# Patient Record
Sex: FEMALE | Race: WHITE | NOT HISPANIC OR LATINO | Employment: FULL TIME | URBAN - METROPOLITAN AREA
[De-identification: names, ages, dates, MRNs, and addresses within clinical notes are randomized per-mention and may not be internally consistent; named-entity substitution may affect disease eponyms.]

---

## 2018-07-13 DIAGNOSIS — H90.5 CENTRAL HEARING LOSS: Primary | ICD-10-CM

## 2018-07-17 ENCOUNTER — HOSPITAL ENCOUNTER (OUTPATIENT)
Dept: RADIOLOGY | Facility: HOSPITAL | Age: 58
Discharge: HOME | End: 2018-07-17
Attending: OTOLARYNGOLOGY
Payer: COMMERCIAL

## 2018-07-17 DIAGNOSIS — H90.5 CENTRAL HEARING LOSS: ICD-10-CM

## 2018-07-17 PROCEDURE — 70553 MRI BRAIN STEM W/O & W/DYE: CPT | Mod: TC

## 2018-07-17 PROCEDURE — 70553 MRI BRAIN STEM W/O & W/DYE: CPT | Mod: 26,,, | Performed by: RADIOLOGY

## 2018-07-17 PROCEDURE — 25500020 PHARM REV CODE 255: Performed by: OTOLARYNGOLOGY

## 2018-07-17 PROCEDURE — A9585 GADOBUTROL INJECTION: HCPCS | Performed by: OTOLARYNGOLOGY

## 2018-07-17 RX ORDER — GADOBUTROL 604.72 MG/ML
5 INJECTION INTRAVENOUS
Status: COMPLETED | OUTPATIENT
Start: 2018-07-17 | End: 2018-07-17

## 2018-07-17 RX ORDER — GADOBUTROL 604.72 MG/ML
INJECTION INTRAVENOUS
Status: DISPENSED
Start: 2018-07-17 | End: 2018-07-17

## 2018-07-17 RX ADMIN — GADOBUTROL 5 ML: 604.72 INJECTION INTRAVENOUS at 09:07

## 2019-11-21 DIAGNOSIS — G47.00 INSOMNIA, UNSPECIFIED TYPE: Primary | ICD-10-CM

## 2019-11-21 RX ORDER — TEMAZEPAM 30 MG/1
30 CAPSULE ORAL NIGHTLY PRN
Qty: 90 CAPSULE | Refills: 0 | Status: SHIPPED | OUTPATIENT
Start: 2019-11-21 | End: 2020-02-19

## 2019-11-21 NOTE — TELEPHONE ENCOUNTER
----- Message from Yonis Nava sent at 11/21/2019  3:53 PM CST -----  Pt is needing refills on Restoril for a 90 day supply   Pharm [facility]   Pt [phone]

## 2020-03-25 ENCOUNTER — TELEPHONE (OUTPATIENT)
Dept: FAMILY MEDICINE | Facility: CLINIC | Age: 60
End: 2020-03-25

## 2020-03-25 ENCOUNTER — OFFICE VISIT (OUTPATIENT)
Dept: FAMILY MEDICINE | Facility: CLINIC | Age: 60
End: 2020-03-25
Payer: COMMERCIAL

## 2020-03-25 DIAGNOSIS — M48.02 SPINAL STENOSIS IN CERVICAL REGION: ICD-10-CM

## 2020-03-25 DIAGNOSIS — M51.26 HERNIATED LUMBAR INTERVERTEBRAL DISC: ICD-10-CM

## 2020-03-25 DIAGNOSIS — J30.1 SEASONAL ALLERGIC RHINITIS DUE TO POLLEN: Primary | ICD-10-CM

## 2020-03-25 DIAGNOSIS — M48.061 NEURAL FORAMINAL STENOSIS OF LUMBAR SPINE: ICD-10-CM

## 2020-03-25 DIAGNOSIS — G95.9 CERVICAL MYELOPATHY: ICD-10-CM

## 2020-03-25 DIAGNOSIS — F51.01 PRIMARY INSOMNIA: ICD-10-CM

## 2020-03-25 DIAGNOSIS — M43.12 SPONDYLOLISTHESIS OF CERVICAL REGION: ICD-10-CM

## 2020-03-25 PROCEDURE — 99213 PR OFFICE/OUTPT VISIT, EST, LEVL III, 20-29 MIN: ICD-10-PCS | Mod: 95,,, | Performed by: FAMILY MEDICINE

## 2020-03-25 PROCEDURE — 99213 OFFICE O/P EST LOW 20 MIN: CPT | Mod: 95,,, | Performed by: FAMILY MEDICINE

## 2020-03-25 RX ORDER — AMOXICILLIN 500 MG/1
500 CAPSULE ORAL EVERY 8 HOURS
Qty: 30 CAPSULE | Refills: 0 | Status: SHIPPED | OUTPATIENT
Start: 2020-03-25 | End: 2022-03-28

## 2020-03-25 RX ORDER — TEMAZEPAM 30 MG/1
30 CAPSULE ORAL NIGHTLY
Qty: 90 CAPSULE | Refills: 0 | Status: SHIPPED | OUTPATIENT
Start: 2020-03-25 | End: 2020-12-17 | Stop reason: SDUPTHER

## 2020-03-25 RX ORDER — FLUTICASONE PROPIONATE 50 MCG
1 SPRAY, SUSPENSION (ML) NASAL DAILY
Qty: 18.2 ML | Refills: 3 | Status: SHIPPED | OUTPATIENT
Start: 2020-03-25 | End: 2022-03-28

## 2020-03-25 NOTE — PROGRESS NOTES
SUBJECTIVE:    Patient ID: Puma Oreilly is a 59 y.o. female.    Chief Complaint: No chief complaint on file.    59-year-old female is having a telemedicine visit.  She complains of a scratchy throat and runny eyes.  She does have seasonal pollen allergies and has been taking Zyrtec.  She has a hoarse voice no fever or chills and no shortness of breath.  She has minor discharge through the nares.  And a minor cough.    She sees Dr. leonid Arango for orthopedic problems he did a cervical fusion on her approximately 1 year ago.  And she notes a good improvement in her neck with only minor numbness and tingling at this time.  She sees Dr. Bernardo Cardiology, had a normal stress test in 2018.  Dr. Gonzalez did a colonoscopy prior to his gastric bypass surgery on her in .  She has not had a colonoscopy since and is due for 1.    She does not do any daily regimen did exercise but enjoys gardening and going for walks on the [place] approximately 20 min with her friends.      No visits with results within 6 Month(s) from this visit.   Latest known visit with results is:   Lab Visit on 2018   Component Date Value Ref Range Status    Creatinine 2018 0.7  0.5 - 1.4 mg/dL Final    eGFR if African American 2018 >60  >60 mL/min/1.73 m^2 Final    eGFR if non African American 2018 >60  >60 mL/min/1.73 m^2 Final       Past Medical History:   Diagnosis Date    Breast, fat necrosis     bilateral    GERD (gastroesophageal reflux disease)     PONV (postoperative nausea and vomiting)     Wears glasses      Past Surgical History:   Procedure Laterality Date    ABDOMINAL SURGERY      abdominalplasty    breast augmentation      breast reduction      CARPAL TUNNEL RELEASE      right    carpel tunnel       SECTION, CLASSIC      esphogeal stricture      GASTRIC BYPASS  2009    HYSTERECTOMY      PARTIAL NEPHRECTOMY      Rt Kidney    SHOULDER SURGERY      SPINAL FUSION   3/2015    [facility]  Dr Pichardo    THIGH LIFT      TONSILLECTOMY, ADENOIDECTOMY       Family History   Problem Relation Age of Onset    Diabetes Mother     Prostate cancer Father     Stroke Maternal Aunt     Cancer Maternal Grandmother         stomach    Lung cancer Maternal Grandmother     Diabetes Maternal Grandfather     Cancer Paternal Grandmother         lung       Marital Status:   Alcohol History:  reports that she does not drink alcohol.  Tobacco History:  reports that she has never smoked. She has never used smokeless tobacco.  Drug History:  reports that she does not use drugs.    Review of patient's allergies indicates:   Allergen Reactions    Hydrocodone Nausea Only       Current Outpatient Medications:     amoxicillin (AMOXIL) 500 MG capsule, Take 1 capsule (500 mg total) by mouth every 8 (eight) hours., Disp: 30 capsule, Rfl: 0    cyanocobalamin (VITAMIN B-12) 1000 MCG tablet, Take 1 tablet (1,000 mcg total) by mouth once daily., Disp: 90 tablet, Rfl: 3    fluticasone (VERAMYST) 27.5 mcg/actuation nasal spray, 2 sprays by Nasal route once daily., Disp: 15.8 mL, Rfl: 3    fluticasone propionate (FLONASE) 50 mcg/actuation nasal spray, 1 spray (50 mcg total) by Each Nostril route once daily., Disp: 18.2 mL, Rfl: 3    gabapentin (NEURONTIN) 100 MG capsule, Take 1 capsule (100 mg total) by mouth 2 (two) times daily., Disp: 60 capsule, Rfl: 2    multivitamin with minerals tablet, Take 1 tablet by mouth once daily., Disp: , Rfl:     temazepam (RESTORIL) 30 mg capsule, Take 1 capsule (30 mg total) by mouth every evening., Disp: 90 capsule, Rfl: 0    Review of Systems   Constitutional: Negative for appetite change, chills, fatigue, fever and unexpected weight change.   HENT: Positive for sneezing. Negative for ear pain, sinus pain, sore throat and trouble swallowing.    Eyes: Negative for pain, discharge and visual disturbance.   Respiratory: Positive for cough. Negative for apnea, chest  tightness, shortness of breath and wheezing.    Cardiovascular: Negative for chest pain, palpitations and leg swelling.   Gastrointestinal: Negative for abdominal pain, blood in stool, constipation, diarrhea, nausea and vomiting.   Endocrine: Negative for heat intolerance, polydipsia and polyuria.   Genitourinary: Negative for difficulty urinating, dyspareunia, dysuria, frequency, hematuria and menstrual problem.   Musculoskeletal: Positive for neck pain (Neck pain is improved since cervical fusion.). Negative for arthralgias, back pain (occas upper back pains occas , on no pain meds), gait problem, joint swelling and myalgias.   Allergic/Immunologic: Negative for environmental allergies, food allergies and immunocompromised state.   Neurological: Negative for dizziness, tremors, seizures, numbness (pins and  needles in neck ) and headaches.   Psychiatric/Behavioral: Positive for sleep disturbance (Uses temazepam for sleep nightly.). Negative for behavioral problems, confusion, hallucinations and suicidal ideas. The patient is not nervous/anxious.           Objective:      There were no vitals filed for this visit.  There is no height or weight on file to calculate BMI.  Physical Exam      Assessment:       1. Seasonal allergic rhinitis due to pollen    2. Spinal stenosis in cervical region    3. Herniated lumbar intervertebral disc    4. Cervical myelopathy    5. Spondylolisthesis of cervical region    6. Neural foraminal stenosis of lumbar spine    7. Primary insomnia         Plan:       Seasonal allergic rhinitis due to pollen  -     fluticasone (VERAMYST) 27.5 mcg/actuation nasal spray; 2 sprays by Nasal route once daily.  Dispense: 15.8 mL; Refill: 3  -     amoxicillin (AMOXIL) 500 MG capsule; Take 1 capsule (500 mg total) by mouth every 8 (eight) hours.  Dispense: 30 capsule; Refill: 0  Patient has symptoms of allergic rhinitis leading into sinusitis.  Will treat with fluticasone and amoxicillin  Spinal  stenosis in cervical region  Improved greatly since cervical surgery with Dr. Arango  Herniated lumbar intervertebral disc    Cervical myelopathy  Minimal numbness and tingling  Spondylolisthesis of cervical region    Neural foraminal stenosis of lumbar spine    Primary insomnia  -     temazepam (RESTORIL) 30 mg capsule; Take 1 capsule (30 mg total) by mouth every evening.  Dispense: 90 capsule; Refill: 0  Continue temazepam 30 mg HS    Follow up in about 3 months (around 6/25/2020).    The patient location is:  home  Visit type: Virtual visit with synchronous audio and video    Total time spent with patient: 20     Each patient to whom he or she provides medical services by telemedicine is:  (1) informed of the relationship between the physician and patient and the respective role of any other health care provider with respect to management of the patient; and (2) notified that he or she may decline to receive medical services by telemedicine and may withdraw from such care at any time.     This note was created using surespot voice recognition software that occasionally misinterprets phrases or words.

## 2020-03-25 NOTE — TELEPHONE ENCOUNTER
----- Message from Yonis Nava sent at 3/25/2020  8:49 AM CDT -----  Pt she needing something for her allergies sent in . Water eyes, itchy throat , no fever   Temazepam 90 day supply   [facility]   Pt [phone]

## 2020-03-25 NOTE — TELEPHONE ENCOUNTER
Spoke with pt and her . They are going to set themselves up for the portal and call back for the VV.

## 2020-05-14 ENCOUNTER — TELEPHONE (OUTPATIENT)
Dept: FAMILY MEDICINE | Facility: CLINIC | Age: 60
End: 2020-05-14

## 2020-05-14 DIAGNOSIS — Z12.31 OTHER SCREENING MAMMOGRAM: Primary | ICD-10-CM

## 2020-05-14 NOTE — TELEPHONE ENCOUNTER
----- Message from Isela Matute sent at 5/14/2020  3:28 PM CDT -----  Pt requesting a mammogram order be sent to [facility] imaging for her annual   cb # [phone]

## 2020-06-09 ENCOUNTER — HOSPITAL ENCOUNTER (OUTPATIENT)
Dept: RADIOLOGY | Facility: HOSPITAL | Age: 60
Discharge: HOME | End: 2020-06-09
Attending: FAMILY MEDICINE
Payer: COMMERCIAL

## 2020-06-09 DIAGNOSIS — Z12.31 OTHER SCREENING MAMMOGRAM: ICD-10-CM

## 2020-06-09 PROCEDURE — 77067 SCR MAMMO BI INCL CAD: CPT | Mod: TC,PO

## 2020-06-16 ENCOUNTER — TELEPHONE (OUTPATIENT)
Dept: FAMILY MEDICINE | Facility: CLINIC | Age: 60
End: 2020-06-16

## 2020-06-16 NOTE — PROGRESS NOTES
Spoke to patient with results verbatim per Dr Byrne. Verbalized understanding. Would like copy sent to Dr Yaquelin Michel, sent to her.

## 2020-06-16 NOTE — TELEPHONE ENCOUNTER
----- Message from Venu Byrne MD sent at 6/14/2020  6:50 PM CDT -----  Call patient.  Her mammogram showed the presence of the breast  Implants, however, no breast tumors or cancers were seen.  Repeat mammogram in 1 year

## 2020-12-17 DIAGNOSIS — F51.01 PRIMARY INSOMNIA: ICD-10-CM

## 2020-12-17 RX ORDER — TEMAZEPAM 30 MG/1
30 CAPSULE ORAL NIGHTLY
Qty: 90 CAPSULE | Refills: 0 | Status: SHIPPED | OUTPATIENT
Start: 2020-12-17 | End: 2021-01-20

## 2021-01-20 ENCOUNTER — TELEPHONE (OUTPATIENT)
Dept: FAMILY MEDICINE | Facility: CLINIC | Age: 61
End: 2021-01-20

## 2021-01-20 DIAGNOSIS — F51.01 PRIMARY INSOMNIA: Primary | ICD-10-CM

## 2021-01-20 RX ORDER — TRAZODONE HYDROCHLORIDE 50 MG/1
50 TABLET ORAL NIGHTLY
Qty: 30 TABLET | Refills: 2 | Status: SHIPPED | OUTPATIENT
Start: 2021-01-20 | End: 2021-01-27

## 2021-01-25 ENCOUNTER — TELEPHONE (OUTPATIENT)
Dept: FAMILY MEDICINE | Facility: CLINIC | Age: 61
End: 2021-01-25

## 2021-01-27 ENCOUNTER — TELEPHONE (OUTPATIENT)
Dept: FAMILY MEDICINE | Facility: CLINIC | Age: 61
End: 2021-01-27

## 2021-01-27 DIAGNOSIS — F51.01 PRIMARY INSOMNIA: ICD-10-CM

## 2021-01-27 DIAGNOSIS — N30.01 ACUTE CYSTITIS WITH HEMATURIA: Primary | ICD-10-CM

## 2021-01-27 RX ORDER — TRAZODONE HYDROCHLORIDE 100 MG/1
100 TABLET ORAL NIGHTLY
Qty: 30 TABLET | Refills: 3 | Status: SHIPPED | OUTPATIENT
Start: 2021-01-27 | End: 2023-10-03

## 2021-01-27 RX ORDER — NITROFURANTOIN 25; 75 MG/1; MG/1
100 CAPSULE ORAL 2 TIMES DAILY
Qty: 20 CAPSULE | Refills: 0 | Status: SHIPPED | OUTPATIENT
Start: 2021-01-27 | End: 2022-03-28

## 2021-04-12 ENCOUNTER — TELEPHONE (OUTPATIENT)
Dept: FAMILY MEDICINE | Facility: CLINIC | Age: 61
End: 2021-04-12

## 2021-04-12 DIAGNOSIS — Z12.31 OTHER SCREENING MAMMOGRAM: Primary | ICD-10-CM

## 2021-04-26 ENCOUNTER — TELEPHONE (OUTPATIENT)
Dept: FAMILY MEDICINE | Facility: CLINIC | Age: 61
End: 2021-04-26

## 2021-05-24 ENCOUNTER — TELEPHONE (OUTPATIENT)
Dept: FAMILY MEDICINE | Facility: CLINIC | Age: 61
End: 2021-05-24

## 2021-06-11 ENCOUNTER — HOSPITAL ENCOUNTER (OUTPATIENT)
Dept: RADIOLOGY | Facility: HOSPITAL | Age: 61
Discharge: HOME | End: 2021-06-11
Attending: FAMILY MEDICINE
Payer: COMMERCIAL

## 2021-06-11 VITALS — HEIGHT: 60 IN | BODY MASS INDEX: 24.76 KG/M2 | WEIGHT: 126.13 LBS

## 2021-06-11 DIAGNOSIS — Z12.31 OTHER SCREENING MAMMOGRAM: ICD-10-CM

## 2021-06-11 PROCEDURE — 77067 SCR MAMMO BI INCL CAD: CPT | Mod: TC,PO

## 2021-06-14 ENCOUNTER — TELEPHONE (OUTPATIENT)
Dept: FAMILY MEDICINE | Facility: CLINIC | Age: 61
End: 2021-06-14

## 2021-12-14 ENCOUNTER — TELEPHONE (OUTPATIENT)
Dept: CARDIOLOGY | Facility: CLINIC | Age: 61
End: 2021-12-14
Payer: COMMERCIAL

## 2021-12-14 ENCOUNTER — NURSE TRIAGE (OUTPATIENT)
Dept: ADMINISTRATIVE | Facility: CLINIC | Age: 61
End: 2021-12-14
Payer: COMMERCIAL

## 2022-03-28 ENCOUNTER — OFFICE VISIT (OUTPATIENT)
Dept: CARDIOLOGY | Facility: CLINIC | Age: 62
End: 2022-03-28
Payer: COMMERCIAL

## 2022-03-28 VITALS
DIASTOLIC BLOOD PRESSURE: 78 MMHG | SYSTOLIC BLOOD PRESSURE: 116 MMHG | WEIGHT: 135 LBS | OXYGEN SATURATION: 98 % | BODY MASS INDEX: 26.37 KG/M2 | HEART RATE: 66 BPM

## 2022-03-28 DIAGNOSIS — I25.110 ATHEROSCLEROSIS OF NATIVE CORONARY ARTERY OF NATIVE HEART WITH UNSTABLE ANGINA PECTORIS: Primary | ICD-10-CM

## 2022-03-28 DIAGNOSIS — G95.9 CERVICAL MYELOPATHY: ICD-10-CM

## 2022-03-28 DIAGNOSIS — Z98.84 WEIGHT GAIN FOLLOWING GASTRIC BYPASS SURGERY: ICD-10-CM

## 2022-03-28 DIAGNOSIS — R63.5 WEIGHT GAIN FOLLOWING GASTRIC BYPASS SURGERY: ICD-10-CM

## 2022-03-28 DIAGNOSIS — R07.89 OTHER CHEST PAIN: ICD-10-CM

## 2022-03-28 PROCEDURE — 99214 OFFICE O/P EST MOD 30 MIN: CPT | Mod: S$GLB,,, | Performed by: INTERNAL MEDICINE

## 2022-03-28 PROCEDURE — 99214 PR OFFICE/OUTPT VISIT, EST, LEVL IV, 30-39 MIN: ICD-10-PCS | Mod: S$GLB,,, | Performed by: INTERNAL MEDICINE

## 2022-03-28 PROCEDURE — 1160F PR REVIEW ALL MEDS BY PRESCRIBER/CLIN PHARMACIST DOCUMENTED: ICD-10-PCS | Mod: CPTII,S$GLB,, | Performed by: INTERNAL MEDICINE

## 2022-03-28 PROCEDURE — 3074F PR MOST RECENT SYSTOLIC BLOOD PRESSURE < 130 MM HG: ICD-10-PCS | Mod: CPTII,S$GLB,, | Performed by: INTERNAL MEDICINE

## 2022-03-28 PROCEDURE — 93005 EKG 12-LEAD: ICD-10-PCS | Mod: S$GLB,,, | Performed by: INTERNAL MEDICINE

## 2022-03-28 PROCEDURE — 3074F SYST BP LT 130 MM HG: CPT | Mod: CPTII,S$GLB,, | Performed by: INTERNAL MEDICINE

## 2022-03-28 PROCEDURE — 3078F DIAST BP <80 MM HG: CPT | Mod: CPTII,S$GLB,, | Performed by: INTERNAL MEDICINE

## 2022-03-28 PROCEDURE — 93005 ELECTROCARDIOGRAM TRACING: CPT | Mod: S$GLB,,, | Performed by: INTERNAL MEDICINE

## 2022-03-28 PROCEDURE — 3078F PR MOST RECENT DIASTOLIC BLOOD PRESSURE < 80 MM HG: ICD-10-PCS | Mod: CPTII,S$GLB,, | Performed by: INTERNAL MEDICINE

## 2022-03-28 PROCEDURE — 3008F BODY MASS INDEX DOCD: CPT | Mod: CPTII,S$GLB,, | Performed by: INTERNAL MEDICINE

## 2022-03-28 PROCEDURE — 1159F MED LIST DOCD IN RCRD: CPT | Mod: CPTII,S$GLB,, | Performed by: INTERNAL MEDICINE

## 2022-03-28 PROCEDURE — 3008F PR BODY MASS INDEX (BMI) DOCUMENTED: ICD-10-PCS | Mod: CPTII,S$GLB,, | Performed by: INTERNAL MEDICINE

## 2022-03-28 PROCEDURE — 1160F RVW MEDS BY RX/DR IN RCRD: CPT | Mod: CPTII,S$GLB,, | Performed by: INTERNAL MEDICINE

## 2022-03-28 PROCEDURE — 1159F PR MEDICATION LIST DOCUMENTED IN MEDICAL RECORD: ICD-10-PCS | Mod: CPTII,S$GLB,, | Performed by: INTERNAL MEDICINE

## 2022-03-28 RX ORDER — PANTOPRAZOLE SODIUM 40 MG/1
40 TABLET, DELAYED RELEASE ORAL DAILY
Qty: 30 TABLET | Refills: 11 | Status: SHIPPED | OUTPATIENT
Start: 2022-03-28 | End: 2022-06-06 | Stop reason: SDUPTHER

## 2022-03-28 NOTE — PROGRESS NOTES
Subjective:    Patient ID:  Puma Oreilly is a 61 y.o. female patient here for evaluation Establish Care and Chest Pain (She was having chest pains in December, she has some reoccurring pains after she eats)      History of Present Illness:   Patient is a 61-year-old lady with history of gastric bypass surgery has been doing reasonably well.  Lately she has been cut back on her lunch hour and she has been eating and [place] and has some difficulty after eating for her meals fast.  She developed some intermittent episodes of chest discomfort arm this gradually gets up.  She is seeking evaluation regarding the same in view of risk factors and family history.  Pain is always localized in this substernal area always in the same spot no jaw pain shoulder pain and occasional nausea is present.  But no significant dyspeptic symptoms are noted.  There is no occurrence of such pain with physical activity climbing stairs or other doing other activities.  She denies having any are irregularities in her bowels.  There is no blood in the stools no black stools          Review of patient's allergies indicates:   Allergen Reactions    Hydrocodone Nausea Only       Past Medical History:   Diagnosis Date    Breast, fat necrosis     bilateral    GERD (gastroesophageal reflux disease)     PONV (postoperative nausea and vomiting)     Wears glasses      Past Surgical History:   Procedure Laterality Date    ABDOMINAL SURGERY      abdominalplasty    AUGMENTATION OF BREAST      breast augmentation      breast reduction      CARPAL TUNNEL RELEASE      right    carpel tunnel       SECTION, CLASSIC      esphogeal stricture      GASTRIC BYPASS  2009    HYSTERECTOMY      PARTIAL NEPHRECTOMY      Rt Kidney    SHOULDER SURGERY      SMALL INTESTINE SURGERY      SPINAL FUSION  3/2015    [facility]  Dr Pichardo    THIGH LIFT      TONSILLECTOMY, ADENOIDECTOMY      TOTAL REDUCTION MAMMOPLASTY       Social History      Tobacco Use    Smoking status: Never Smoker    Smokeless tobacco: Never Used   Substance Use Topics    Alcohol use: No    Drug use: No        Review of Systems   As noted in HPI in addition     Constitutional: Negative for chills, fatigue and fever.   Eyes: No double vision, No blurred vision  Neuro: No headaches, she did have some tinnitus about 3 years ago that was treated with steroids with marginal improvement.  This has gotten better lately  Respiratory: Negative for cough, shortness of breath and wheezing.    Cardiovascular:  Positive for recurrent  chest pain. Negative for palpitations and leg swelling.   Gastrointestinal: Negative for abdominal pain, No melena, diarrhea, nausea and vomiting.   Genitourinary: Negative for dysuria and frequency, Negative for hematuria  Skin: Negative for bruising, Negative for edema or discoloration noted.   Endocrine: Negative for polyphagia, Negative for heat intolerance, Negative for cold intolerance  Psychiatric: Negative for depression, Negative for anxiety, Negative for memory loss  Musculoskeletal: Negative for neck pain, Negative for muscle weakness, Negative for back pain          Objective        Vitals:    03/28/22 1659   BP: 116/78   Pulse: 66       LIPIDS - LAST 2   Lab Results   Component Value Date    CHOL 149 05/26/2011    HDL 70 05/26/2011    LDLCALC 68.6 05/26/2011    TRIG 52 05/26/2011    CHOLHDL 47.0 05/26/2011       CBC - LAST 2  Lab Results   Component Value Date    WBC 6.80 06/06/2016    WBC 5.80 02/26/2016    RBC 4.80 06/06/2016    RBC 4.80 02/26/2016    HGB 12.8 06/06/2016    HGB 12.7 02/26/2016    HCT 39.3 06/06/2016    HCT 39.3 02/26/2016    MCV 82 06/06/2016    MCV 82 02/26/2016    MCH 26.6 (L) 06/06/2016    MCH 26.5 (L) 02/26/2016    MCHC 32.5 06/06/2016    MCHC 32.4 02/26/2016    RDW 13.0 06/06/2016    RDW 13.8 02/26/2016     06/06/2016     02/26/2016    MPV 8.0 (L) 06/06/2016    MPV 8.2 (L) 02/26/2016    GRAN 4.0 06/06/2016     GRAN 58.6 06/06/2016    LYMPH 2.4 06/06/2016    LYMPH 35.6 06/06/2016    MONO 0.3 06/06/2016    MONO 3.9 (L) 06/06/2016    BASO 0.00 06/06/2016    BASO 0.00 02/26/2016       CHEMISTRY & LIVER FUNCTION - LAST 2  Lab Results   Component Value Date     06/06/2016     02/26/2016    K 4.1 06/06/2016    K 4.2 02/26/2016     06/06/2016     02/26/2016    CO2 24 06/06/2016    CO2 26 02/26/2016    ANIONGAP 9 06/06/2016    ANIONGAP 10 02/26/2016    BUN 13 06/06/2016    BUN 12 02/26/2016    CREATININE 0.7 07/17/2018    CREATININE 0.8 06/06/2016     (H) 06/06/2016    GLU 81 02/26/2016    CALCIUM 9.4 06/06/2016    CALCIUM 9.2 02/26/2016    MG 2.1 06/06/2016    MG 2.3 06/17/2013    ALBUMIN 4.2 06/06/2016    ALBUMIN 4.4 02/26/2016    PROT 7.0 06/06/2016    PROT 7.2 02/26/2016    ALKPHOS 104 06/06/2016    ALKPHOS 110 02/26/2016    ALT 19 06/06/2016    ALT 22 02/26/2016    AST 23 06/06/2016    AST 26 02/26/2016    BILITOT 0.5 06/06/2016    BILITOT 0.2 02/26/2016        CARDIAC PROFILE - LAST 2  Lab Results   Component Value Date     06/06/2016     12/28/2015        COAGULATION - LAST 2  Lab Results   Component Value Date    INR 1.0 02/24/2015    APTT 28.0 02/24/2015       ENDOCRINE & PSA - LAST 2  Lab Results   Component Value Date    TSH 0.750 05/26/2011        ECHOCARDIOGRAM RESULTS  No results found for this or any previous visit.      CURRENT/PREVIOUS VISIT EKG  Results for orders placed or performed during the hospital encounter of 02/24/15   EKG 12-lead    Collection Time: 02/24/15 10:22 AM    Narrative    Test Reason : 723.1    Vent. Rate : 058 BPM     Atrial Rate : 058 BPM     P-R Int : 156 ms          QRS Dur : 080 ms      QT Int : 394 ms       P-R-T Axes : 018 025 042 degrees     QTc Int : 386 ms    Sinus bradycardia  Otherwise normal ECG  When compared with ECG of 17-JUN-2013 07:43,  No significant change was found  Confirmed by Juan Marie MD ([id number]) on 2/24/2015 9:25:31  PM    Referred By: KENNEY RM           Confirmed By:Juan Marie MD     No valid procedures specified.   No results found for this or any previous visit.    No valid procedures specified.          PREVIOUS STRESS TEST              PREVIOUS ANGIOGRAM        PHYSICAL EXAM    GENERAL: well built, well nourished, well-developed in no apparent distress alert and oriented.   HEENT: Normocephalic. Pupils normal and conjunctivae normal.  Mucous membranes normal, no cyanosis or icterus, trachea central,no pallor or icterus is noted..   NECK: No JVD. No bruit..  Surgical scar in the neck did noted.  THYROID: Thyroid not enlarged. No nodules present..   CARDIAC: Regular rate and rhythm. S1 is normal.S2 is normal.No gallops, clicks or murmurs noted at this time.  CHEST ANATOMY: normal.   LUNGS: Clear to auscultation. No wheezing or rhonchi..   ABDOMEN: Soft no masses or organomegaly.  No abdomen pulsations or bruits.  Normal bowel sounds. No pulsations and no masses felt, No guarding or rebound.   EXTREMITIES: No cyanosis, clubbing or edema noted at this time., no calf tenderness bilaterally.   PERIPHERAL VASCULAR SYSTEM: Good palpable distal pulses.   CENTRAL NERVOUS SYSTEM:  Detailed exam was not performed  SKIN: Skin without lesions, moist, well perfused.   MUSCLE STRENGTH & TONE: No noteable weakness, atrophy or abnormal movement.     I HAVE REVIEWED :    The vital signs, nurses notes, and all the pertinent radiology and labs.        Current Outpatient Medications   Medication Instructions    multivitamin with minerals tablet 1 tablet, Oral, Daily    traZODone (DESYREL) 100 mg, Oral, Nightly          Assessment & Plan     Other chest pain  Patient has episodes of chest pain with some atypical features.  Arm will obtain a symptom limited exercise stress test and with myocardial perfusion imaging study.  In the meantime I will start on Protonix at 40 mg daily her EKG today shows sinus rhythm and sinus arrhythmia with no  acute changes noted.    Cervical myelopathy  Check history of cervical myelopathy and she had 2 cervical surgeries completed.  With fusion    Weight gain following gastric bypass surgery  She has done remarkably well.  She has gained about 20 lb over the course of years of since the bypass surgery and she is doing remarkably well at this time I have encouraged to obtain all her are annual blood work as well as a vitamin B12 and are iron levels as a part of her workup this times.    Dyslipidemia and  Strong family history of premature heart disease in atrial fibrillation    A recommended her to obtain complete panel of blood work to include are chemistry as well as liver lipid profile.        No follow-ups on file.

## 2022-03-28 NOTE — ASSESSMENT & PLAN NOTE
She has done remarkably well.  She has gained about 20 lb over the course of years of since the bypass surgery and she is doing remarkably well at this time I have encouraged to obtain all her are annual blood work as well as a vitamin B12 and are iron levels as a part of her workup this times.

## 2022-03-28 NOTE — ASSESSMENT & PLAN NOTE
Patient has episodes of chest pain with some atypical features.  Arm will obtain a symptom limited exercise stress test and with myocardial perfusion imaging study.  In the meantime I will start on Protonix at 40 mg daily her EKG today shows sinus rhythm and sinus arrhythmia with no acute changes noted.

## 2022-03-29 ENCOUNTER — TELEPHONE (OUTPATIENT)
Dept: FAMILY MEDICINE | Facility: CLINIC | Age: 62
End: 2022-03-29
Payer: COMMERCIAL

## 2022-03-29 NOTE — TELEPHONE ENCOUNTER
----- Message from Isela Giovani sent at 3/29/2022 12:41 PM CDT -----  Pt calling said her cardiologist  Dr. SILVINO Bernardo old her to contact us to have labs ordered but he did not specify what test he wants done.   # [phone]

## 2022-03-30 ENCOUNTER — TELEPHONE (OUTPATIENT)
Dept: CARDIOLOGY | Facility: CLINIC | Age: 62
End: 2022-03-30
Payer: COMMERCIAL

## 2022-03-30 NOTE — TELEPHONE ENCOUNTER
----- Message from Jordon Escoto sent at 3/30/2022  2:56 PM CDT -----  Contact: pt  Needing a call back from a nurse to get bloodwork orders please call back pt asap     [phone] call back after 4 pm

## 2022-04-21 ENCOUNTER — TELEPHONE (OUTPATIENT)
Dept: CARDIOLOGY | Facility: CLINIC | Age: 62
End: 2022-04-21

## 2022-04-21 NOTE — TELEPHONE ENCOUNTER
----- Message from Manjula Vang sent at 4/21/2022 10:47 AM CDT -----  Type: Needs Medical Advice  Who Called:  Pt  Best Call Back Number: [phone] (home) [phone] (work)  Additional Information: Pt requesting Stress test to be scheduled after 05/23 and requesting return call to discuss if Lab orders are still available for Pt to - Pt requesting LVM on [phone]--please advise--thank you         Multiple attempts to reach patient today. No answer  Unable to leave voice mail. Will continue to try to reach patient.

## 2022-04-25 DIAGNOSIS — G95.9 CERVICAL MYELOPATHY: ICD-10-CM

## 2022-04-25 DIAGNOSIS — R07.89 OTHER CHEST PAIN: ICD-10-CM

## 2022-04-25 DIAGNOSIS — G95.9 MYELOPATHY: Primary | ICD-10-CM

## 2022-05-11 ENCOUNTER — TELEPHONE (OUTPATIENT)
Dept: CARDIOLOGY | Facility: CLINIC | Age: 62
End: 2022-05-11
Payer: COMMERCIAL

## 2022-05-11 NOTE — TELEPHONE ENCOUNTER
Spoke with pt, states she had labs at [facility] and was wanting to know if the results were sent to [facility]. Not seeing them as of yet. Pt wants a phone call after upcoming testing with results. Pt confirmed and verbalized understanding.

## 2022-05-11 NOTE — TELEPHONE ENCOUNTER
----- Message from Xander Ramirez sent at 5/11/2022  4:30 PM CDT -----  Type:  Patient Returning Call    Who Called:  Pt   Who Left Message for Patient:  Maggie  Does the patient know what this is regarding?:  results and appt  Best Call Back Number:  [phone]     Additional Information:  Please advise -- Thank you

## 2022-05-11 NOTE — TELEPHONE ENCOUNTER
----- Message from Jessica Martinez sent at 5/11/2022 11:07 AM CDT -----  Type: Needs Medical Advice  Who Called: Patient  Symptoms (please be specific):   How long has patient had these symptoms:    Pharmacy name and phone #:    Best Call Back Number: [phone] after 4:00  Additional Information: Pt requesting a call back with Lab results and to schedule a f/u appt.

## 2022-05-13 ENCOUNTER — TELEPHONE (OUTPATIENT)
Dept: CARDIOLOGY | Facility: CLINIC | Age: 62
End: 2022-05-13
Payer: COMMERCIAL

## 2022-05-13 NOTE — TELEPHONE ENCOUNTER
----- Message from Jordon Escoto sent at 5/13/2022 10:37 AM CDT -----  Contact: pt  Type: Needs Medical Advice    Who Called:pt  Best Call Back Number:[phone] leave a message if need be     Additional Information: [facility] and seeing if VB reviewed them please have a nurse call back pt, also needs appt after stress test      Please Advise-Thank you

## 2022-05-16 NOTE — TELEPHONE ENCOUNTER
Clinic has not received lab results as of yet. Pt states that she would like a phone call with testing results.

## 2022-05-17 ENCOUNTER — TELEPHONE (OUTPATIENT)
Dept: CARDIOLOGY | Facility: CLINIC | Age: 62
End: 2022-05-17
Payer: COMMERCIAL

## 2022-05-17 NOTE — TELEPHONE ENCOUNTER
----- Message from Zuly Ramirez sent at 5/17/2022  1:35 PM CDT -----  Contact: pt  Type: Needs Medical Advice    Who: Called: Pt     Best Call Back Number: [phone]    Inquiry/Question: ot is calling in for her lab results that  ordered she had done at [facility] she states she has called several times please call to advise  Thank you~

## 2022-05-17 NOTE — TELEPHONE ENCOUNTER
Spoke with pt, let her know labs have not been received and to please drop off for review. Pt verbalized understanding.

## 2022-05-25 ENCOUNTER — TELEPHONE (OUTPATIENT)
Dept: CARDIOLOGY | Facility: CLINIC | Age: 62
End: 2022-05-25
Payer: COMMERCIAL

## 2022-05-25 NOTE — TELEPHONE ENCOUNTER
Patient states she had her labs done at [facility] with [facility]. She is aware I will call to get the results sent over    Called to let her know [facility] had the wrong fax number

## 2022-05-25 NOTE — TELEPHONE ENCOUNTER
----- Message from Catarina Cox sent at 5/25/2022 10:25 AM CDT -----  Regarding: test results  Contact: Patient  Type:  Test Results    Who Called:  Patient   Name of Test (Lab/Mammo/Etc):  bloodwork  Date of Test:  3 weeks ago per pt  Ordering Provider:  Dr Bernardo  Where the test was performed:  [facility]  Best Call Back Number:  [phone] (home) [phone] (work)    Case number [id number]

## 2022-05-27 DIAGNOSIS — R07.89 OTHER CHEST PAIN: Primary | ICD-10-CM

## 2022-05-31 DIAGNOSIS — R07.9 CHEST PAIN, UNSPECIFIED TYPE: ICD-10-CM

## 2022-06-01 ENCOUNTER — CLINICAL SUPPORT (OUTPATIENT)
Dept: CARDIOLOGY | Facility: HOSPITAL | Age: 62
End: 2022-06-01
Attending: INTERNAL MEDICINE
Payer: COMMERCIAL

## 2022-06-01 ENCOUNTER — HOSPITAL ENCOUNTER (OUTPATIENT)
Dept: RADIOLOGY | Facility: HOSPITAL | Age: 62
Discharge: HOME | End: 2022-06-01
Attending: INTERNAL MEDICINE
Payer: COMMERCIAL

## 2022-06-01 VITALS — HEIGHT: 60 IN | BODY MASS INDEX: 26.49 KG/M2 | WEIGHT: 134.94 LBS

## 2022-06-01 DIAGNOSIS — Z98.84 WEIGHT GAIN FOLLOWING GASTRIC BYPASS SURGERY: ICD-10-CM

## 2022-06-01 DIAGNOSIS — I25.110 ATHEROSCLEROSIS OF NATIVE CORONARY ARTERY OF NATIVE HEART WITH UNSTABLE ANGINA PECTORIS: ICD-10-CM

## 2022-06-01 DIAGNOSIS — R07.9 CHEST PAIN, UNSPECIFIED TYPE: ICD-10-CM

## 2022-06-01 DIAGNOSIS — R07.89 OTHER CHEST PAIN: ICD-10-CM

## 2022-06-01 DIAGNOSIS — R63.5 WEIGHT GAIN FOLLOWING GASTRIC BYPASS SURGERY: ICD-10-CM

## 2022-06-01 LAB
AV INDEX (PROSTH): 0.98
AV MEAN GRADIENT: 3 MMHG
AV VALVE AREA: 3.15 CM2
BSA FOR ECHO PROCEDURE: 1.61 M2
CV STRESS BASE HR: 58 BPM
DIASTOLIC BLOOD PRESSURE: 79 MMHG
DOP CALC AO VTI: 24.28 CM
DOP CALC LVOT AREA: 3.2 CM2
DOP CALC LVOT DIAMETER: 2.02 CM
DOP CALC LVOT PEAK VEL: 100.83 M/S
DOP CALC LVOT STROKE VOLUME: 76.39 CM3
DOP CALCLVOT PEAK VEL VTI: 23.85 CM
E WAVE DECELERATION TIME: 140.89 MSEC
E/A RATIO: 1.48
E/E' RATIO: 8.9 M/S
EJECTION FRACTION: 60 %
FRACTIONAL SHORTENING: 43 % (ref 28–44)
IVRT: 78.73 MSEC
LEFT ATRIUM SIZE: 3.85 CM
LEFT INTERNAL DIMENSION IN SYSTOLE: 2.1 CM (ref 2.1–4)
LEFT VENTRICLE DIASTOLIC VOLUME INDEX: 33.89 ML/M2
LEFT VENTRICLE DIASTOLIC VOLUME: 53.55 ML
LEFT VENTRICLE SYSTOLIC VOLUME INDEX: 6.6 ML/M2
LEFT VENTRICLE SYSTOLIC VOLUME: 10.39 ML
LEFT VENTRICULAR INTERNAL DIMENSION IN DIASTOLE: 3.69 CM (ref 3.5–6)
LV LATERAL E/E' RATIO: 8.9 M/S
LV SEPTAL E/E' RATIO: 8.9 M/S
MV PEAK A VEL: 0.6 M/S
MV PEAK E VEL: 0.89 M/S
OHS CV CPX 1 MINUTE RECOVERY HEART RATE: 124 BPM
OHS CV CPX 85 PERCENT MAX PREDICTED HEART RATE MALE: 127
OHS CV CPX ESTIMATED METS: 10
OHS CV CPX MAX PREDICTED HEART RATE: 150
OHS CV CPX PATIENT IS FEMALE: 1
OHS CV CPX PATIENT IS MALE: 0
OHS CV CPX PEAK DIASTOLIC BLOOD PRESSURE: 100 MMHG
OHS CV CPX PEAK HEAR RATE: 171 BPM
OHS CV CPX PEAK RATE PRESSURE PRODUCT: NORMAL
OHS CV CPX PEAK SYSTOLIC BLOOD PRESSURE: 169 MMHG
OHS CV CPX PERCENT MAX PREDICTED HEART RATE ACHIEVED: 114
OHS CV CPX RATE PRESSURE PRODUCT PRESENTING: 8352
PISA TR MAX VEL: 2.3 M/S
RA PRESSURE: 3 MMHG
RIGHT VENTRICULAR END-DIASTOLIC DIMENSION: 283 CM
STRESS ECHO POST EXERCISE DUR MIN: 7 MINUTES
STRESS ECHO POST EXERCISE DUR SEC: 33 SECONDS
SYSTOLIC BLOOD PRESSURE: 144 MMHG
TDI LATERAL: 0.1 M/S
TDI SEPTAL: 0.1 M/S
TDI: 0.1 M/S
TR MAX PG: 21 MMHG
TRICUSPID ANNULAR PLANE SYSTOLIC EXCURSION: 242 CM
TV REST PULMONARY ARTERY PRESSURE: 24 MMHG

## 2022-06-01 PROCEDURE — 93016 NUCLEAR STRESS TEST (CUPID ONLY): ICD-10-PCS | Mod: ,,, | Performed by: GENERAL PRACTICE

## 2022-06-01 PROCEDURE — 93018 CV STRESS TEST I&R ONLY: CPT | Mod: ,,, | Performed by: GENERAL PRACTICE

## 2022-06-01 PROCEDURE — 93306 TTE W/DOPPLER COMPLETE: CPT | Mod: 26,,, | Performed by: GENERAL PRACTICE

## 2022-06-01 PROCEDURE — 93306 TTE W/DOPPLER COMPLETE: CPT

## 2022-06-01 PROCEDURE — 93017 CV STRESS TEST TRACING ONLY: CPT

## 2022-06-01 PROCEDURE — 93306 ECHO (CUPID ONLY): ICD-10-PCS | Mod: 26,,, | Performed by: GENERAL PRACTICE

## 2022-06-01 PROCEDURE — 93018 NUCLEAR STRESS TEST (CUPID ONLY): ICD-10-PCS | Mod: ,,, | Performed by: GENERAL PRACTICE

## 2022-06-01 PROCEDURE — 93016 CV STRESS TEST SUPVJ ONLY: CPT | Mod: ,,, | Performed by: GENERAL PRACTICE

## 2022-06-01 PROCEDURE — A9502 TC99M TETROFOSMIN: HCPCS

## 2022-06-02 ENCOUNTER — TELEPHONE (OUTPATIENT)
Dept: CARDIOLOGY | Facility: CLINIC | Age: 62
End: 2022-06-02
Payer: COMMERCIAL

## 2022-06-02 NOTE — TELEPHONE ENCOUNTER
----- Message from Abdi Rodriguez MA sent at 6/2/2022  1:05 PM CDT -----    Type: Test Results    Who Called: DESIREE COPELAND [[id number]]  Name of Test:  (labs, xray etc..) stress,echo  Date of Test: 06,01,22  Ordering Provider:bet,v    Best Call Back Number: [phone]   Thank you~

## 2022-06-06 RX ORDER — PANTOPRAZOLE SODIUM 40 MG/1
40 TABLET, DELAYED RELEASE ORAL DAILY
Qty: 90 TABLET | Refills: 3 | Status: SHIPPED | OUTPATIENT
Start: 2022-06-06 | End: 2023-10-03

## 2022-06-06 NOTE — TELEPHONE ENCOUNTER
----- Message from Rajinder Mendieta sent at 6/6/2022 10:14 AM CDT -----  Contact: pt  Type:  RX Refill Request    Who Called:  pt  Refill or New Rx:  refill / new  RX Name and Strength:  pantoprazole (PROTONIX) 40 MG tablet  How is the patient currently taking it? (ex. 1XDay):  1 x   Is this a 30 day or 90 day RX:  90  Preferred Pharmacy with phone number:      [facility]pharm[facility] #[id number] - [place]  [address]  [address] [address]  Phone: [phone] Fax: [phone]    Local or Mail Order:    Ordering Provider:    Best Call Back Number:  [phone]    Additional Information:  pt states her symptoms are not improving. Pt is requesting a stronger Rx or a different Rx. Please call to advise.

## 2022-06-10 ENCOUNTER — TELEPHONE (OUTPATIENT)
Dept: FAMILY MEDICINE | Facility: CLINIC | Age: 62
End: 2022-06-10

## 2022-06-10 NOTE — TELEPHONE ENCOUNTER
----- Message from Maria Guadalupe Cabrera sent at 6/10/2022  9:44 AM CDT -----  Pt dropped off lab results for provider.  [phone]

## 2022-06-15 ENCOUNTER — TELEPHONE (OUTPATIENT)
Dept: CARDIOLOGY | Facility: CLINIC | Age: 62
End: 2022-06-15
Payer: COMMERCIAL

## 2022-06-15 NOTE — TELEPHONE ENCOUNTER
----- Message from Jessica Martinez sent at 6/15/2022  9:50 AM CDT -----  Type: Needs Medical Advice  Who Called: Patient  Symptoms (please be specific):   How long has patient had these symptoms:    Pharmacy name and phone #:  [id number] - [place] [address] [place]   Phone:  [phone]  Fax:  [phone]  Best Call Back Number: [phone]  Additional Information: Pt requesting a call back concerning Rx   pantoprazole (PROTONIX) 40 MG tablet 90 tablet , pt state she needs something stronger OR a different medication, The pantoprazole (PROTONIX) does not help her.

## 2022-06-17 ENCOUNTER — TELEPHONE (OUTPATIENT)
Dept: CARDIOLOGY | Facility: CLINIC | Age: 62
End: 2022-06-17
Payer: COMMERCIAL

## 2022-06-17 NOTE — TELEPHONE ENCOUNTER
----- Message from Zuly Ramirez sent at 6/17/2022 12:57 PM CDT -----  Contact: pt  Type: Return Call    Who Called: Nurse   Who Left Message: Hermelinda   Does the patient know what this is regarding: Yes  Best Call Back Number: [phone]  Thank you~

## 2022-06-17 NOTE — TELEPHONE ENCOUNTER
----- Message from Abdi Rodriguez MA sent at 6/17/2022  8:50 AM CDT -----  Contact: DESIREE COPELAND [[id number]]  Type: Needs Medical Advice    Who Called: DESIREE COPELAND [[id number]]  Best Call Back Number: [phone]  Inquiry/Question: Wild you call DESIREE COPELAND [[id number]] regarding questions with test    Thank you~

## 2022-06-20 NOTE — TELEPHONE ENCOUNTER
Spoke with pt, had billing questions. Gave phone information for pt to contact. Pt verbalized understanding.

## 2022-06-30 ENCOUNTER — TELEPHONE (OUTPATIENT)
Dept: CARDIOLOGY | Facility: CLINIC | Age: 62
End: 2022-06-30
Payer: COMMERCIAL

## 2022-06-30 NOTE — TELEPHONE ENCOUNTER
----- Message from Abdi Rodriguez MA sent at 6/30/2022  9:32 AM CDT -----  Contact: DESIREE COPELAND [[id number]]  Type: Needs Medical Advice    Who Called:DESIREE COPELAND [[id number]]  Best Call Back Number: [phone]  Inquiry/Question: please call DESIREE COPELAND [[id number]] regarding medical record, test results need for 07/01/22.     Thank you~

## 2022-07-01 ENCOUNTER — TELEPHONE (OUTPATIENT)
Dept: CARDIOLOGY | Facility: CLINIC | Age: 62
End: 2022-07-01
Payer: COMMERCIAL

## 2022-07-05 ENCOUNTER — TELEPHONE (OUTPATIENT)
Dept: CARDIOLOGY | Facility: CLINIC | Age: 62
End: 2022-07-05
Payer: COMMERCIAL

## 2022-07-05 NOTE — TELEPHONE ENCOUNTER
----- Message from Abdi Rodriguez MA sent at 7/5/2022 11:48 AM CDT -----  Contact: DESIREE COPELAND [[id number]]  Type: Needs Medical Advice    Who CalledDESIREE COPELAND [[id number]]  Best Call Back Number: [phone]  Inquiry/Question: please call DESRIEE COPELAND [[id number]] regarding test results      Thank you~

## 2022-07-08 ENCOUNTER — TELEPHONE (OUTPATIENT)
Dept: CARDIOLOGY | Facility: CLINIC | Age: 62
End: 2022-07-08
Payer: COMMERCIAL

## 2022-07-12 DIAGNOSIS — Z12.31 ENCOUNTER FOR SCREENING MAMMOGRAM FOR MALIGNANT NEOPLASM OF BREAST: Primary | ICD-10-CM

## 2022-07-25 ENCOUNTER — HOSPITAL ENCOUNTER (OUTPATIENT)
Dept: RADIOLOGY | Facility: HOSPITAL | Age: 62
Discharge: HOME | End: 2022-07-25
Attending: FAMILY MEDICINE
Payer: COMMERCIAL

## 2022-07-25 DIAGNOSIS — Z12.31 ENCOUNTER FOR SCREENING MAMMOGRAM FOR MALIGNANT NEOPLASM OF BREAST: ICD-10-CM

## 2022-07-25 PROCEDURE — 77063 BREAST TOMOSYNTHESIS BI: CPT | Mod: TC,PO

## 2022-08-01 ENCOUNTER — TELEPHONE (OUTPATIENT)
Dept: FAMILY MEDICINE | Facility: CLINIC | Age: 62
End: 2022-08-01

## 2022-08-01 NOTE — TELEPHONE ENCOUNTER
----- Message from Deborah Castillo sent at 8/1/2022  4:29 PM CDT -----  Voicemail- Patient said someone from Dr. Byrne's office called her. She recently had a Mammogram. She would like a call back at 8:00 a.m. tomorrow morning. Patient's callback number is [phone].

## 2022-08-01 NOTE — TELEPHONE ENCOUNTER
----- Message from Venu Byrne MD sent at 7/31/2022  4:59 PM CDT -----  Call patient.  Mammogram was normal.  Repeat mammogram in 1 year.

## 2022-08-02 NOTE — TELEPHONE ENCOUNTER
----- Message from Daisy De Jesus sent at 8/2/2022  8:18 AM CDT -----  Pt is returning the office call. Pt #[phone]

## 2023-07-17 ENCOUNTER — TELEPHONE (OUTPATIENT)
Dept: CARDIOLOGY | Facility: CLINIC | Age: 63
End: 2023-07-17
Payer: COMMERCIAL

## 2023-07-17 NOTE — TELEPHONE ENCOUNTER
----- Message from Ana Hughes sent at 7/17/2023  4:37 PM CDT -----  Contact: Self  Type:  Same Day Appointment Request    Caller is requesting a same day appointment.  Caller declined first available appointment listed below.      Name of Caller:  Patient  When is the first available appointment?  09/11  Symptoms:  [facility] f/u, has rhythm strip from [facility] Call Back Number:  [phone]  Additional Information:   Please call pt back to schedule. Thank You.

## 2023-07-18 DIAGNOSIS — Z12.31 ENCOUNTER FOR SCREENING MAMMOGRAM FOR MALIGNANT NEOPLASM OF BREAST: Primary | ICD-10-CM

## 2023-07-18 NOTE — TELEPHONE ENCOUNTER
----- Message from Ijeoma Celis, Patient Care Assistant sent at 7/18/2023  1:20 PM CDT -----  Regarding: advice  Contact: pt  Type: Needs Medical Advice    Who Called:  pt     Best Call Back Number: [phone] (home)     Additional Information: pt states she would like a callback from an  regarding her surgery on yesterday. Please call pt to advise. Thanks!

## 2023-07-18 NOTE — TELEPHONE ENCOUNTER
Called and scheduled aristeo with Galina, she is aware I was unable to schedule her with Dr. Bernardo

## 2023-07-21 ENCOUNTER — OFFICE VISIT (OUTPATIENT)
Dept: CARDIOLOGY | Facility: CLINIC | Age: 63
End: 2023-07-21
Payer: COMMERCIAL

## 2023-07-21 VITALS
SYSTOLIC BLOOD PRESSURE: 122 MMHG | OXYGEN SATURATION: 98 % | HEIGHT: 60 IN | DIASTOLIC BLOOD PRESSURE: 78 MMHG | WEIGHT: 142 LBS | HEART RATE: 80 BPM | BODY MASS INDEX: 27.88 KG/M2

## 2023-07-21 DIAGNOSIS — R07.81 PLEURITIC CHEST PAIN: ICD-10-CM

## 2023-07-21 DIAGNOSIS — Z98.890 HX OF HERNIA REPAIR: ICD-10-CM

## 2023-07-21 DIAGNOSIS — Z87.19 HX OF HERNIA REPAIR: ICD-10-CM

## 2023-07-21 DIAGNOSIS — R00.9 ABNORMAL HEART RATE: Primary | ICD-10-CM

## 2023-07-21 DIAGNOSIS — R07.89 OTHER CHEST PAIN: ICD-10-CM

## 2023-07-21 PROCEDURE — 3078F PR MOST RECENT DIASTOLIC BLOOD PRESSURE < 80 MM HG: ICD-10-PCS | Mod: CPTII,S$GLB,,

## 2023-07-21 PROCEDURE — 93000 ELECTROCARDIOGRAM COMPLETE: CPT | Mod: S$GLB,,, | Performed by: INTERNAL MEDICINE

## 2023-07-21 PROCEDURE — 3078F DIAST BP <80 MM HG: CPT | Mod: CPTII,S$GLB,,

## 2023-07-21 PROCEDURE — 99999 PR PBB SHADOW E&M-EST. PATIENT-LVL III: CPT | Mod: PBBFAC,,,

## 2023-07-21 PROCEDURE — 1160F PR REVIEW ALL MEDS BY PRESCRIBER/CLIN PHARMACIST DOCUMENTED: ICD-10-PCS | Mod: CPTII,S$GLB,,

## 2023-07-21 PROCEDURE — 3008F PR BODY MASS INDEX (BMI) DOCUMENTED: ICD-10-PCS | Mod: CPTII,S$GLB,,

## 2023-07-21 PROCEDURE — 3288F PR FALLS RISK ASSESSMENT DOCUMENTED: ICD-10-PCS | Mod: CPTII,S$GLB,,

## 2023-07-21 PROCEDURE — 99214 OFFICE O/P EST MOD 30 MIN: CPT | Mod: S$GLB,,,

## 2023-07-21 PROCEDURE — 3288F FALL RISK ASSESSMENT DOCD: CPT | Mod: CPTII,S$GLB,,

## 2023-07-21 PROCEDURE — 1101F PR PT FALLS ASSESS DOC 0-1 FALLS W/OUT INJ PAST YR: ICD-10-PCS | Mod: CPTII,S$GLB,,

## 2023-07-21 PROCEDURE — 3008F BODY MASS INDEX DOCD: CPT | Mod: CPTII,S$GLB,,

## 2023-07-21 PROCEDURE — 99999 PR PBB SHADOW E&M-EST. PATIENT-LVL III: ICD-10-PCS | Mod: PBBFAC,,,

## 2023-07-21 PROCEDURE — 3074F SYST BP LT 130 MM HG: CPT | Mod: CPTII,S$GLB,,

## 2023-07-21 PROCEDURE — 1159F PR MEDICATION LIST DOCUMENTED IN MEDICAL RECORD: ICD-10-PCS | Mod: CPTII,S$GLB,,

## 2023-07-21 PROCEDURE — 1160F RVW MEDS BY RX/DR IN RCRD: CPT | Mod: CPTII,S$GLB,,

## 2023-07-21 PROCEDURE — 1159F MED LIST DOCD IN RCRD: CPT | Mod: CPTII,S$GLB,,

## 2023-07-21 PROCEDURE — 1101F PT FALLS ASSESS-DOCD LE1/YR: CPT | Mod: CPTII,S$GLB,,

## 2023-07-21 PROCEDURE — 3074F PR MOST RECENT SYSTOLIC BLOOD PRESSURE < 130 MM HG: ICD-10-PCS | Mod: CPTII,S$GLB,,

## 2023-07-21 PROCEDURE — 99214 PR OFFICE/OUTPT VISIT, EST, LEVL IV, 30-39 MIN: ICD-10-PCS | Mod: S$GLB,,,

## 2023-07-21 PROCEDURE — 93000 EKG 12-LEAD: ICD-10-PCS | Mod: S$GLB,,, | Performed by: INTERNAL MEDICINE

## 2023-07-21 RX ORDER — METHYLPREDNISOLONE 4 MG/1
TABLET ORAL
Qty: 21 EACH | Refills: 0 | Status: SHIPPED | OUTPATIENT
Start: 2023-07-21 | End: 2023-08-11

## 2023-07-21 NOTE — ASSESSMENT & PLAN NOTE
Patient had tachycardia during recovery phase of procedure.  Possible MELCHOR prolonging with dropped beat- consider second-degree AV block Wenckebach.   Recommend event monitor for 1 week for further evaluation.

## 2023-07-21 NOTE — ASSESSMENT & PLAN NOTE
Pain with deep breathing s/p laparoscopic hernia repair.  Equal Breath sounds bilaterally.  Likely inflammatory in nature. Recommend Medrol dose pack.  Patient to follow-up with GI next week s/p procedure

## 2023-07-21 NOTE — PROGRESS NOTES
Subjective:    Patient ID:  Puma Oreilly is a 63 y.o. female patient here for evaluation Follow-up (Abnormal heart rate during a Laparoscopy Hernia repair surgery  . Chest fullness and back hurting. )      History of Present Illness:       Patient is here for a checkup.  She would a recent laparoscopic hernia repair surgery where she had an abnormal heart rate.  Rhythm strip post procedure reviewed.  Sinus rhythm which looks like possible second-degree AV block- Wenckebach.  This was during recovery. Patient has 4 incision to upper abdomen.  She has chest fullness and discomfort when she takes a deep breath.  No anginal equivalent symptoms. EKG today in office shows normal sinus rhythm  Rightward access, low voltage QRS.  She is passing gas and has had a bowel movement since procedure.    /78.      Review of patient's allergies indicates:   Allergen Reactions    Hydrocodone Nausea Only       Past Medical History:   Diagnosis Date    Breast, fat necrosis     bilateral    GERD (gastroesophageal reflux disease)     PONV (postoperative nausea and vomiting)     Wears glasses      Past Surgical History:   Procedure Laterality Date    ABDOMINAL SURGERY      abdominalplasty    AUGMENTATION OF BREAST      breast augmentation      breast reduction      CARPAL TUNNEL RELEASE      right    carpel tunnel       SECTION, CLASSIC      esphogeal stricture      GASTRIC BYPASS  2009    HYSTERECTOMY      PARTIAL NEPHRECTOMY      Rt Kidney    SHOULDER SURGERY      SMALL INTESTINE SURGERY      SPINAL FUSION  3/2015    ACDF  Dr Pichardo    THIGH LIFT      TONSILLECTOMY, ADENOIDECTOMY      TOTAL REDUCTION MAMMOPLASTY       Social History     Tobacco Use    Smoking status: Never    Smokeless tobacco: Never   Substance Use Topics    Alcohol use: No    Drug use: No        Review of Systems:    As noted in HPI in addition      REVIEW OF SYSTEMS  CARDIOVASCULAR: No recent chest pain, palpitations, arm, neck, or jaw pain   + CP with deep breathing  RESPIRATORY: No recent fever, cough chills, SOB or congestion  : No blood in the urine  GI: No Nausea, vomiting, constipation, diarrhea, blood, or reflux.  MUSCULOSKELETAL: No myalgias  NEURO: No lightheadedness or dizziness  EYES: No Double vision, blurry, vision or headache              Objective        Vitals:    07/21/23 1304   BP: 122/78   Pulse: 80       LIPIDS - LAST 2   Lab Results   Component Value Date    CHOL 149 05/26/2011    HDL 70 05/26/2011    LDLCALC 68.6 05/26/2011    TRIG 52 05/26/2011    CHOLHDL 47.0 05/26/2011       CBC - LAST 2  Lab Results   Component Value Date    WBC 6.80 06/06/2016    WBC 5.80 02/26/2016    RBC 4.80 06/06/2016    RBC 4.80 02/26/2016    HGB 12.8 06/06/2016    HGB 12.7 02/26/2016    HCT 39.3 06/06/2016    HCT 39.3 02/26/2016    MCV 82 06/06/2016    MCV 82 02/26/2016    MCH 26.6 (L) 06/06/2016    MCH 26.5 (L) 02/26/2016    MCHC 32.5 06/06/2016    MCHC 32.4 02/26/2016    RDW 13.0 06/06/2016    RDW 13.8 02/26/2016     06/06/2016     02/26/2016    MPV 8.0 (L) 06/06/2016    MPV 8.2 (L) 02/26/2016    GRAN 4.0 06/06/2016    GRAN 58.6 06/06/2016    LYMPH 2.4 06/06/2016    LYMPH 35.6 06/06/2016    MONO 0.3 06/06/2016    MONO 3.9 (L) 06/06/2016    BASO 0.00 06/06/2016    BASO 0.00 02/26/2016       CHEMISTRY & LIVER FUNCTION - LAST 2  Lab Results   Component Value Date     06/06/2016     02/26/2016    K 4.1 06/06/2016    K 4.2 02/26/2016     06/06/2016     02/26/2016    CO2 24 06/06/2016    CO2 26 02/26/2016    ANIONGAP 9 06/06/2016    ANIONGAP 10 02/26/2016    BUN 13 06/06/2016    BUN 12 02/26/2016    CREATININE 0.7 07/17/2018    CREATININE 0.8 06/06/2016     (H) 06/06/2016    GLU 81 02/26/2016    CALCIUM 9.4 06/06/2016    CALCIUM 9.2 02/26/2016    MG 2.1 06/06/2016    MG 2.3 06/17/2013    ALBUMIN 4.2 06/06/2016    ALBUMIN 4.4 02/26/2016    PROT 7.0 06/06/2016    PROT 7.2 02/26/2016    ALKPHOS 104 06/06/2016     ALKPHOS 110 02/26/2016    ALT 19 06/06/2016    ALT 22 02/26/2016    AST 23 06/06/2016    AST 26 02/26/2016    BILITOT 0.5 06/06/2016    BILITOT 0.2 02/26/2016        CARDIAC PROFILE - LAST 2  Lab Results   Component Value Date     06/06/2016     12/28/2015        COAGULATION - LAST 2  Lab Results   Component Value Date    INR 1.0 02/24/2015    APTT 28.0 02/24/2015       ENDOCRINE & PSA - LAST 2  Lab Results   Component Value Date    TSH 0.750 05/26/2011        ECHOCARDIOGRAM RESULTS  Results for orders placed in visit on 06/01/22    Echo    Interpretation Summary  · The estimated PA systolic pressure is 24 mmHg.  · The left ventricle is normal in size with normal systolic function.  · Normal left ventricular diastolic function.  · Normal right ventricular size with normal right ventricular systolic function.  · Normal central venous pressure (3 mmHg).  · The estimated ejection fraction is 60%.      CURRENT/PREVIOUS VISIT EKG  Results for orders placed or performed in visit on 03/28/22   IN OFFICE EKG 12-LEAD (to [facility])    Collection Time: 03/28/22  5:08 PM    Narrative    Test Reason : I25.110,R07.89,R63.5,Z98.84,    Vent. Rate : 062 BPM     Atrial Rate : 062 BPM     P-R Int : 150 ms          QRS Dur : 068 ms      QT Int : 384 ms       P-R-T Axes : 014 012 041 degrees     QTc Int : 389 ms    Normal sinus rhythm with sinus arrhythmia  Normal ECG  When compared with ECG of 24-FEB-2015 10:22,  No significant change was found  Confirmed by Romana CHURCHILL, Elvin NAQVI ([id number]) on 4/17/2022 5:01:29 PM    Referred By:  RICKY           Confirmed By:Elvin Avendano MD     No valid procedures specified.   Results for orders placed in visit on 06/01/22    Nuclear Stress Test    Interpretation Summary    The nuclear portion of this study will be reported separately.    The EKG portion of this study is negative for ischemia.    The patient reported no chest pain during the stress test.    There were no arrhythmias  during stress.    No valid procedures specified.    PHYSICAL EXAM  CONSTITUTIONAL: Well built, well nourished in no apparent distress  NECK: no carotid bruit, no JVD  LUNGS: CTA  CHEST WALL: no tenderness  HEART: regular rate and rhythm, S1, S2 normal, no murmur, click, rub or gallop   ABDOMEN: soft,mild tenderness in upper abdomen; bowel sounds normal- no acute distention.  4 small incision noted on abdomen- healing, no acute signs of infection  EXTREMITIES: Extremities normal, no edema,   NEURO: AAO X 3    I HAVE REVIEWED :    The vital signs, nurses notes, and all the pertinent radiology and labs.        Current Outpatient Medications   Medication Instructions    methylPREDNISolone (MEDROL DOSEPACK) 4 mg tablet use as directed    multivitamin with minerals tablet 1 tablet, Oral, Daily    pantoprazole (PROTONIX) 40 mg, Oral, Daily    traZODone (DESYREL) 100 mg, Oral, Nightly          Assessment & Plan     Other chest pain  Patient complains of chest pain that is worsened with deep breathing and certain movements.  S/p laparoscopic hernia repair.  Chest pain appears more pleuritic in nature.  Recommend Medrol Dosepak.  Abdomen is soft, mild tenderness in the upper quadrants.  No acute distention.  Active bowel sounds.  Recommend adding simethicone over-the-counter for gas pain.        Pleuritic chest pain  Pain with deep breathing s/p laparoscopic hernia repair.  Equal Breath sounds bilaterally.  Likely inflammatory in nature. Recommend Medrol dose pack.  Patient to follow-up with GI next week s/p procedure    Hx of hernia repair  Recent hernia repair.  Pleuritic pain s/p procedure. Medrol dose pack.  Discussed with patient that she can try NSAIDS ibuprofen for a short duration, 1 week if pain doesn't improve with steroids.    Abnormal heart rate  Patient had tachycardia during recovery phase of procedure.  Possible MELCHOR prolonging with dropped beat- consider second-degree AV block Kvngnckebach.   Recommend event  monitor for 1 week for further evaluation.          No follow-ups on file.

## 2023-07-21 NOTE — ASSESSMENT & PLAN NOTE
Recent hernia repair.  Pleuritic pain s/p procedure. Medrol dose pack.  Discussed with patient that she can try NSAIDS ibuprofen for a short duration, 1 week if pain doesn't improve with steroids.

## 2023-07-21 NOTE — ASSESSMENT & PLAN NOTE
Patient complains of chest pain that is worsened with deep breathing and certain movements.  S/p laparoscopic hernia repair.  Chest pain appears more pleuritic in nature.  Recommend Medrol Dosepak.  Abdomen is soft, mild tenderness in the upper quadrants.  No acute distention.  Active bowel sounds.  Recommend adding simethicone over-the-counter for gas pain.

## 2023-07-24 DIAGNOSIS — R00.9 ABNORMAL HEART RATE: ICD-10-CM

## 2023-07-24 DIAGNOSIS — I44.1 SECOND DEGREE AV BLOCK: Primary | ICD-10-CM

## 2023-08-03 ENCOUNTER — HOSPITAL ENCOUNTER (OUTPATIENT)
Dept: RADIOLOGY | Facility: HOSPITAL | Age: 63
Discharge: HOME | End: 2023-08-03
Attending: FAMILY MEDICINE
Payer: COMMERCIAL

## 2023-08-03 DIAGNOSIS — Z12.31 ENCOUNTER FOR SCREENING MAMMOGRAM FOR MALIGNANT NEOPLASM OF BREAST: ICD-10-CM

## 2023-08-03 PROCEDURE — 77067 SCR MAMMO BI INCL CAD: CPT | Mod: TC,PO

## 2023-08-04 ENCOUNTER — TELEPHONE (OUTPATIENT)
Dept: CARDIOLOGY | Facility: CLINIC | Age: 63
End: 2023-08-04
Payer: COMMERCIAL

## 2023-08-04 NOTE — TELEPHONE ENCOUNTER
----- Message from Kaylin Worthy sent at 8/4/2023 12:17 PM CDT -----  Type: Need Medical Advice   Who Called: Patient  Best callback number: [phone]  Additional Information: Patient called to get test results   Please call to further assist with test results, Thanks.

## 2023-08-07 ENCOUNTER — TELEPHONE (OUTPATIENT)
Dept: FAMILY MEDICINE | Facility: CLINIC | Age: 63
End: 2023-08-07

## 2023-08-07 NOTE — TELEPHONE ENCOUNTER
----- Message from Venu Byrne MD sent at 8/6/2023  6:20 PM CDT -----  Call patient.  Mammogram was normal.  Repeat mammogram in 1 year.

## 2023-08-08 NOTE — TELEPHONE ENCOUNTER
Called patient again, not home. She works for the school system per . This makes 3 attempts. Can I chen result complete?

## 2023-08-09 ENCOUNTER — TELEPHONE (OUTPATIENT)
Dept: FAMILY MEDICINE | Facility: CLINIC | Age: 63
End: 2023-08-09

## 2023-08-09 NOTE — TELEPHONE ENCOUNTER
----- Message from Mariangel Ordonez sent at 8/9/2023  8:03 AM CDT -----  Returning a call from yesterday. Pt's #  [phone]

## 2023-08-14 ENCOUNTER — TELEPHONE (OUTPATIENT)
Dept: CARDIOLOGY | Facility: CLINIC | Age: 63
End: 2023-08-14
Payer: COMMERCIAL

## 2023-08-14 DIAGNOSIS — I47.10 SVT (SUPRAVENTRICULAR TACHYCARDIA): Primary | ICD-10-CM

## 2023-08-14 RX ORDER — METOPROLOL TARTRATE 25 MG/1
12.5 TABLET, FILM COATED ORAL 2 TIMES DAILY
Qty: 30 TABLET | Refills: 11 | Status: ON HOLD | OUTPATIENT
Start: 2023-08-14 | End: 2024-02-09 | Stop reason: HOSPADM

## 2023-08-14 RX ORDER — LANOLIN ALCOHOL/MO/W.PET/CERES
400 CREAM (GRAM) TOPICAL DAILY
Qty: 90 TABLET | Refills: 3 | Status: SHIPPED | OUTPATIENT
Start: 2023-08-14

## 2023-08-14 NOTE — TELEPHONE ENCOUNTER
Tried to call patient but patient was not home. Did leave detailed vm for patient regarding meds and appt with Dr. Romero. Did advise pt to call us tomorrow if questions.

## 2023-08-15 ENCOUNTER — TELEPHONE (OUTPATIENT)
Dept: CARDIOLOGY | Facility: CLINIC | Age: 63
End: 2023-08-15

## 2023-08-15 NOTE — TELEPHONE ENCOUNTER
----- Message from Flaquito Lyons sent at 8/15/2023 10:59 AM CDT -----  Regarding: questions  Contact: alirio at [phone]  Type: Needs Medical Advice    Who Called:  Alirio     Best Call Back Number: [phone] please leave message on home phone b/c pt cannot take calls in class.    Additional Information: pt had abd hernia surgery in July 2023. Aft surgery heart rate was erratic. Pt surgery area is still infected where laparoscopic surgical instrument was inserted. PT needs to know if needs to get med filled or let body adjust from surgery.

## 2023-08-15 NOTE — TELEPHONE ENCOUNTER
Patient was called to verify she did get vm yesterday. Patient did and she said she wanted to let Dr. Bernardo know she had a surgery under anesthesia and this all started post surgery.

## 2023-08-25 ENCOUNTER — TELEPHONE (OUTPATIENT)
Dept: CARDIOLOGY | Facility: CLINIC | Age: 63
End: 2023-08-25
Payer: COMMERCIAL

## 2023-08-25 NOTE — TELEPHONE ENCOUNTER
----- Message from Kaylin Worthy sent at 8/25/2023 10:29 AM CDT -----  Type: Need Medical Advice   Who Called: Patient   Best callback number: [phone]  Additional Information: Patient called wanting to know if she can be seen before her scheduled appointment on 9/6 she is still having symptoms in her chest  Please call to further assist, Thanks.

## 2023-08-25 NOTE — TELEPHONE ENCOUNTER
Tried to call patient but had to leave vm that if she is having sx she needs to go to ER due to the only appt I have is the one for 9/6 that she is scheduled for.

## 2023-08-31 NOTE — PROGRESS NOTES
"Subjective:     HPI    I had the pleasure of seeing Puma Oreilly in consultation at your request for the evaluation of SVT. She is a 63F who was noted to have an abnormal heart rhythm following laparoscopic hernia repair in 7/2023 (strips not available for review). Pt had no cardiac sx during this time. This prompted a 7 day Zio monitor (reviewed by me today) showing sinus rhythm average 76 bpm (range  bpm), PAC burden 4%, and 2058 runs of SVT, longest 94 minutes with average rate 154 bpm. Given variable AV block, SVT is most consistent with AT  ms. Pt had no symptoms while wearing monitor.    Pt admits to occasional palpitations lasting at most 5 minutes. She has a home HR monitor which "jumps around".    Pt currently on lopressor 12.5 mg bid, started following monitor result.    Echo in 6/2022 showed EF 60%. Exercise SPECT in 6/2022 negative for ischemia.     Notably, pt has a history of PAF (12/12/2018--seen on ECG preop for spine surgery). No records available for review.    My interpretation of today's ECG is    Review of Systems   Constitutional: Negative for decreased appetite, malaise/fatigue, weight gain and weight loss.   HENT:  Negative for sore throat.    Eyes:  Negative for blurred vision.   Cardiovascular:  Positive for palpitations. Negative for chest pain, dyspnea on exertion, irregular heartbeat, leg swelling, near-syncope, orthopnea, paroxysmal nocturnal dyspnea and syncope.   Respiratory:  Negative for shortness of breath.    Skin:  Negative for rash.   Musculoskeletal:  Negative for arthritis.   Gastrointestinal:  Negative for abdominal pain.   Neurological:  Negative for focal weakness.   Psychiatric/Behavioral:  Negative for altered mental status.      Objective:   Physical Exam  Vitals and nursing note reviewed.   Constitutional:       General: She is not in acute distress.     Appearance: She is well-developed.   HENT:      Head: Normocephalic and atraumatic.   Eyes:      " General: No scleral icterus.     Conjunctiva/sclera: Conjunctivae normal.      Pupils: Pupils are equal, round, and reactive to light.   Neck:      Thyroid: No thyromegaly.      Vascular: No JVD.   Cardiovascular:      Rate and Rhythm: Normal rate and regular rhythm.      Pulses: Intact distal pulses.      Heart sounds: Normal heart sounds. No murmur heard.     No friction rub. No gallop.   Pulmonary:      Effort: Pulmonary effort is normal. No respiratory distress.      Breath sounds: Normal breath sounds.   Abdominal:      General: Bowel sounds are normal. There is no distension.      Palpations: Abdomen is soft.   Musculoskeletal:      Cervical back: Normal range of motion and neck supple.   Skin:     General: Skin is warm and dry.   Neurological:      Mental Status: She is alert and oriented to person, place, and time.   Psychiatric:         Behavior: Behavior normal.         Assessment:      1. SVT (supraventricular tachycardia)        Plan:     In summary, Puma Oreilly  is a 63F with a history of symptomatic, sustained AT. Discussed pathophysiology of AT as well as options for management including antiarrhythmics and catheter based therapies. Discussed risks, benefits, indications, alternatives to invasive EPS and AT ablation. After considering her options she has agreed to proceed with meds.     Plan is to start rythmol sr 225 mg bid, 3 day monitor in 2 months, follow-up 3 months.    Thank you for allowing me to participate in the care of this patient. Please do not hesitate to call me with any questions or concerns.

## 2023-09-01 PROBLEM — I47.10 SVT (SUPRAVENTRICULAR TACHYCARDIA): Status: ACTIVE | Noted: 2023-09-01

## 2023-09-06 ENCOUNTER — OFFICE VISIT (OUTPATIENT)
Dept: CARDIOLOGY | Facility: CLINIC | Age: 63
End: 2023-09-06
Payer: COMMERCIAL

## 2023-09-06 ENCOUNTER — TELEPHONE (OUTPATIENT)
Dept: ELECTROPHYSIOLOGY | Facility: CLINIC | Age: 63
End: 2023-09-06
Payer: COMMERCIAL

## 2023-09-06 VITALS
DIASTOLIC BLOOD PRESSURE: 74 MMHG | SYSTOLIC BLOOD PRESSURE: 124 MMHG | OXYGEN SATURATION: 99 % | HEART RATE: 66 BPM | BODY MASS INDEX: 27.44 KG/M2 | RESPIRATION RATE: 16 BRPM | HEIGHT: 60 IN | WEIGHT: 139.75 LBS

## 2023-09-06 DIAGNOSIS — I47.10 SVT (SUPRAVENTRICULAR TACHYCARDIA): ICD-10-CM

## 2023-09-06 PROCEDURE — 1159F MED LIST DOCD IN RCRD: CPT | Mod: CPTII,S$GLB,, | Performed by: INTERNAL MEDICINE

## 2023-09-06 PROCEDURE — 1159F PR MEDICATION LIST DOCUMENTED IN MEDICAL RECORD: ICD-10-PCS | Mod: CPTII,S$GLB,, | Performed by: INTERNAL MEDICINE

## 2023-09-06 PROCEDURE — 1160F PR REVIEW ALL MEDS BY PRESCRIBER/CLIN PHARMACIST DOCUMENTED: ICD-10-PCS | Mod: CPTII,S$GLB,, | Performed by: INTERNAL MEDICINE

## 2023-09-06 PROCEDURE — 99205 OFFICE O/P NEW HI 60 MIN: CPT | Mod: S$GLB,,, | Performed by: INTERNAL MEDICINE

## 2023-09-06 PROCEDURE — 3008F BODY MASS INDEX DOCD: CPT | Mod: CPTII,S$GLB,, | Performed by: INTERNAL MEDICINE

## 2023-09-06 PROCEDURE — 3078F DIAST BP <80 MM HG: CPT | Mod: CPTII,S$GLB,, | Performed by: INTERNAL MEDICINE

## 2023-09-06 PROCEDURE — 3074F PR MOST RECENT SYSTOLIC BLOOD PRESSURE < 130 MM HG: ICD-10-PCS | Mod: CPTII,S$GLB,, | Performed by: INTERNAL MEDICINE

## 2023-09-06 PROCEDURE — 99205 PR OFFICE/OUTPT VISIT, NEW, LEVL V, 60-74 MIN: ICD-10-PCS | Mod: S$GLB,,, | Performed by: INTERNAL MEDICINE

## 2023-09-06 PROCEDURE — 3008F PR BODY MASS INDEX (BMI) DOCUMENTED: ICD-10-PCS | Mod: CPTII,S$GLB,, | Performed by: INTERNAL MEDICINE

## 2023-09-06 PROCEDURE — 3074F SYST BP LT 130 MM HG: CPT | Mod: CPTII,S$GLB,, | Performed by: INTERNAL MEDICINE

## 2023-09-06 PROCEDURE — 3288F FALL RISK ASSESSMENT DOCD: CPT | Mod: CPTII,S$GLB,, | Performed by: INTERNAL MEDICINE

## 2023-09-06 PROCEDURE — 3288F PR FALLS RISK ASSESSMENT DOCUMENTED: ICD-10-PCS | Mod: CPTII,S$GLB,, | Performed by: INTERNAL MEDICINE

## 2023-09-06 PROCEDURE — 1101F PT FALLS ASSESS-DOCD LE1/YR: CPT | Mod: CPTII,S$GLB,, | Performed by: INTERNAL MEDICINE

## 2023-09-06 PROCEDURE — 1160F RVW MEDS BY RX/DR IN RCRD: CPT | Mod: CPTII,S$GLB,, | Performed by: INTERNAL MEDICINE

## 2023-09-06 PROCEDURE — 1101F PR PT FALLS ASSESS DOC 0-1 FALLS W/OUT INJ PAST YR: ICD-10-PCS | Mod: CPTII,S$GLB,, | Performed by: INTERNAL MEDICINE

## 2023-09-06 PROCEDURE — 3078F PR MOST RECENT DIASTOLIC BLOOD PRESSURE < 80 MM HG: ICD-10-PCS | Mod: CPTII,S$GLB,, | Performed by: INTERNAL MEDICINE

## 2023-09-06 RX ORDER — PROPAFENONE HYDROCHLORIDE 225 MG/1
225 CAPSULE, EXTENDED RELEASE ORAL EVERY 12 HOURS
Qty: 60 CAPSULE | Refills: 11 | Status: ON HOLD | OUTPATIENT
Start: 2023-09-06 | End: 2024-02-09 | Stop reason: HOSPADM

## 2023-09-06 NOTE — TELEPHONE ENCOUNTER
----- Message from Colleen Yao RN sent at 9/6/2023  3:26 PM CDT -----  Regarding: FW: Questions and concerns  Contact: [phone]    ----- Message -----  From: Manjula Doherty  Sent: 9/6/2023   1:52 PM CDT  To: Heather Smith Staff  Subject: Questions and concerns                           Type:  Needs Medical Advice    Who Called: Puma  Would the patient rather a call back or a response via [facility]? Call and leave a message. Patient doesn't us the portal  Best Call Back Number: [phone]  Additional Information: Patient was seen today and put on a new med. Patient would like to know if this is in addition to her old meds or should she d/c her other med.

## 2023-09-07 ENCOUNTER — TELEPHONE (OUTPATIENT)
Dept: ELECTROPHYSIOLOGY | Facility: CLINIC | Age: 63
End: 2023-09-07
Payer: COMMERCIAL

## 2023-09-07 ENCOUNTER — TELEPHONE (OUTPATIENT)
Dept: CARDIOLOGY | Facility: CLINIC | Age: 63
End: 2023-09-07
Payer: COMMERCIAL

## 2023-09-07 NOTE — TELEPHONE ENCOUNTER
----- Message from Flaquito Lyons sent at 9/7/2023  2:26 PM CDT -----  Regarding: test results and AVS  Type: Needs Medical Advice    Who Called:  Lucy    Akash Call Back Number: [phone]    Additional Information: please mail copy of holter results and most recent AVS and notes from RAYMOND Wu and Dr Romero to Home addres. Pt does not have access to portal so would like mailed.

## 2023-09-07 NOTE — TELEPHONE ENCOUNTER
----- Message from Reggie Cotto MA sent at 9/7/2023  3:19 PM CDT -----  Regarding: FW: med question    ----- Message -----  From: Flaquito Lyons  Sent: 9/7/2023   2:23 PM CDT  To: Heather Smith Staff  Subject: med question                                     Type: Needs Medical Advice    Who Called:  Lucy Bustos Call Back Number: [phone] // pt is teacher please leave msg    Additional Information: pt was just seen by dr edge and given new med. Pt wants to know should start taking new med with current meds given RAYMOND Rogel. Or if should stop NP Meds and only take Dr Edge Med. Please call

## 2023-09-07 NOTE — TELEPHONE ENCOUNTER
Called patient to return her call regarding medication questions. Patient did not answer. I left a message with my call back number.

## 2023-09-08 ENCOUNTER — TELEPHONE (OUTPATIENT)
Dept: ELECTROPHYSIOLOGY | Facility: CLINIC | Age: 63
End: 2023-09-08
Payer: COMMERCIAL

## 2023-09-08 NOTE — TELEPHONE ENCOUNTER
Patient returned my call. I told her she was to start the Rhythmol and not to discontinue any of her other meds. Patient verbalized understanding.

## 2023-09-13 ENCOUNTER — TELEPHONE (OUTPATIENT)
Dept: CARDIOLOGY | Facility: CLINIC | Age: 63
End: 2023-09-13
Payer: COMMERCIAL

## 2023-09-13 NOTE — TELEPHONE ENCOUNTER
----- Message from Christie Tao sent at 9/13/2023  4:34 PM CDT -----  Regarding: heart monitor  Contact: pt [phone]  Type: Needs Medical Advice  Who Called:  Pt     Best Call Back Number: [phone]  Additional Information: Pt calling to schedule heart monitor. Pls call back and advise

## 2023-09-14 ENCOUNTER — TELEPHONE (OUTPATIENT)
Dept: FAMILY MEDICINE | Facility: CLINIC | Age: 63
End: 2023-09-14

## 2023-09-14 NOTE — TELEPHONE ENCOUNTER
----- Message from Blu Barrett sent at 9/14/2023  1:58 PM CDT -----  Pt states Dr. Maharaj would like a copy of the pts EKG from 12/12/18     [phone]

## 2023-09-26 ENCOUNTER — TELEPHONE (OUTPATIENT)
Dept: FAMILY MEDICINE | Facility: CLINIC | Age: 63
End: 2023-09-26

## 2023-09-26 NOTE — TELEPHONE ENCOUNTER
----- Message from Carol Flannery sent at 9/26/2023  2:38 PM CDT -----  Patient called and stated that she need a copy of her EKG that was dated 12/2018 sent to  office. Patient called and stated to please give her a call at [phone] and advise when the test was sent to 's office

## 2023-09-29 ENCOUNTER — TELEPHONE (OUTPATIENT)
Dept: CARDIOLOGY | Facility: CLINIC | Age: 63
End: 2023-09-29
Payer: COMMERCIAL

## 2023-09-29 NOTE — TELEPHONE ENCOUNTER
Called and spoke with patient, she is aware. She will need to follow up with her pcp first to see if they can order some testing first. She will call if anything starts with cardiac

## 2023-09-29 NOTE — TELEPHONE ENCOUNTER
----- Message from Fracisco Abad sent at 9/29/2023  3:08 PM CDT -----  Contact: Self  Type: Needs Medical Advice  Who Called:  Patient  Best Call Back Number: [phone]   Additional Information: Called to speak with office. States she had several falls over past year, and one today, would like office to know if she have MRI or CT. Please call.

## 2023-10-02 ENCOUNTER — TELEPHONE (OUTPATIENT)
Dept: FAMILY MEDICINE | Facility: CLINIC | Age: 63
End: 2023-10-02

## 2023-10-02 NOTE — TELEPHONE ENCOUNTER
----- Message from Pratibha Cruz sent at 10/2/2023  2:02 PM CDT -----  Pt has not been feeling well. She has fallen twice since December. She is not sure if she should see a neurologist or have a mri done. The buzzing in her ears is getting worse   [phone]

## 2023-10-03 ENCOUNTER — OFFICE VISIT (OUTPATIENT)
Dept: FAMILY MEDICINE | Facility: CLINIC | Age: 63
End: 2023-10-03
Payer: COMMERCIAL

## 2023-10-03 VITALS
DIASTOLIC BLOOD PRESSURE: 72 MMHG | HEIGHT: 59 IN | HEART RATE: 60 BPM | SYSTOLIC BLOOD PRESSURE: 118 MMHG | WEIGHT: 137 LBS | BODY MASS INDEX: 27.62 KG/M2

## 2023-10-03 DIAGNOSIS — Z79.899 ENCOUNTER FOR LONG-TERM (CURRENT) USE OF OTHER MEDICATIONS: ICD-10-CM

## 2023-10-03 DIAGNOSIS — Z23 NEED FOR INFLUENZA VACCINATION: ICD-10-CM

## 2023-10-03 DIAGNOSIS — Z12.11 SPECIAL SCREENING FOR MALIGNANT NEOPLASMS, COLON: Primary | ICD-10-CM

## 2023-10-03 DIAGNOSIS — I47.10 SVT (SUPRAVENTRICULAR TACHYCARDIA): ICD-10-CM

## 2023-10-03 DIAGNOSIS — Z78.0 MENOPAUSE: ICD-10-CM

## 2023-10-03 DIAGNOSIS — R42 DIZZINESS: ICD-10-CM

## 2023-10-03 DIAGNOSIS — Z23 VACCINE FOR STREPTOCOCCUS PNEUMONIAE AND INFLUENZA: ICD-10-CM

## 2023-10-03 DIAGNOSIS — Z13.820 SCREENING FOR OSTEOPOROSIS: ICD-10-CM

## 2023-10-03 PROCEDURE — 3008F BODY MASS INDEX DOCD: CPT | Mod: CPTII,S$GLB,, | Performed by: PHYSICIAN ASSISTANT

## 2023-10-03 PROCEDURE — 3288F PR FALLS RISK ASSESSMENT DOCUMENTED: ICD-10-PCS | Mod: CPTII,S$GLB,, | Performed by: PHYSICIAN ASSISTANT

## 2023-10-03 PROCEDURE — 99214 OFFICE O/P EST MOD 30 MIN: CPT | Mod: 25,S$GLB,, | Performed by: PHYSICIAN ASSISTANT

## 2023-10-03 PROCEDURE — 1159F PR MEDICATION LIST DOCUMENTED IN MEDICAL RECORD: ICD-10-PCS | Mod: CPTII,S$GLB,, | Performed by: PHYSICIAN ASSISTANT

## 2023-10-03 PROCEDURE — 90694 VACC AIIV4 NO PRSRV 0.5ML IM: CPT | Mod: S$GLB,,, | Performed by: PHYSICIAN ASSISTANT

## 2023-10-03 PROCEDURE — 1159F MED LIST DOCD IN RCRD: CPT | Mod: CPTII,S$GLB,, | Performed by: PHYSICIAN ASSISTANT

## 2023-10-03 PROCEDURE — 3288F FALL RISK ASSESSMENT DOCD: CPT | Mod: CPTII,S$GLB,, | Performed by: PHYSICIAN ASSISTANT

## 2023-10-03 PROCEDURE — 3074F SYST BP LT 130 MM HG: CPT | Mod: CPTII,S$GLB,, | Performed by: PHYSICIAN ASSISTANT

## 2023-10-03 PROCEDURE — 3078F PR MOST RECENT DIASTOLIC BLOOD PRESSURE < 80 MM HG: ICD-10-PCS | Mod: CPTII,S$GLB,, | Performed by: PHYSICIAN ASSISTANT

## 2023-10-03 PROCEDURE — 90677 PNEUMOCOCCAL CONJUGATE VACCINE 20-VALENT: ICD-10-PCS | Mod: S$GLB,,, | Performed by: PHYSICIAN ASSISTANT

## 2023-10-03 PROCEDURE — 90677 PCV20 VACCINE IM: CPT | Mod: S$GLB,,, | Performed by: PHYSICIAN ASSISTANT

## 2023-10-03 PROCEDURE — 90472 PNEUMOCOCCAL CONJUGATE VACCINE 20-VALENT: ICD-10-PCS | Mod: S$GLB,,, | Performed by: PHYSICIAN ASSISTANT

## 2023-10-03 PROCEDURE — 90471 IMMUNIZATION ADMIN: CPT | Mod: S$GLB,,, | Performed by: PHYSICIAN ASSISTANT

## 2023-10-03 PROCEDURE — 3008F PR BODY MASS INDEX (BMI) DOCUMENTED: ICD-10-PCS | Mod: CPTII,S$GLB,, | Performed by: PHYSICIAN ASSISTANT

## 2023-10-03 PROCEDURE — 3078F DIAST BP <80 MM HG: CPT | Mod: CPTII,S$GLB,, | Performed by: PHYSICIAN ASSISTANT

## 2023-10-03 PROCEDURE — 3074F PR MOST RECENT SYSTOLIC BLOOD PRESSURE < 130 MM HG: ICD-10-PCS | Mod: CPTII,S$GLB,, | Performed by: PHYSICIAN ASSISTANT

## 2023-10-03 PROCEDURE — 1101F PR PT FALLS ASSESS DOC 0-1 FALLS W/OUT INJ PAST YR: ICD-10-PCS | Mod: CPTII,S$GLB,, | Performed by: PHYSICIAN ASSISTANT

## 2023-10-03 PROCEDURE — 99214 PR OFFICE/OUTPT VISIT, EST, LEVL IV, 30-39 MIN: ICD-10-PCS | Mod: 25,S$GLB,, | Performed by: PHYSICIAN ASSISTANT

## 2023-10-03 PROCEDURE — 1101F PT FALLS ASSESS-DOCD LE1/YR: CPT | Mod: CPTII,S$GLB,, | Performed by: PHYSICIAN ASSISTANT

## 2023-10-03 PROCEDURE — 90694 FLU VACCINE - QUADRIVALENT - ADJUVANTED: ICD-10-PCS | Mod: S$GLB,,, | Performed by: PHYSICIAN ASSISTANT

## 2023-10-03 PROCEDURE — 90472 IMMUNIZATION ADMIN EACH ADD: CPT | Mod: S$GLB,,, | Performed by: PHYSICIAN ASSISTANT

## 2023-10-03 PROCEDURE — 90471 FLU VACCINE - QUADRIVALENT - ADJUVANTED: ICD-10-PCS | Mod: S$GLB,,, | Performed by: PHYSICIAN ASSISTANT

## 2023-10-03 NOTE — PROGRESS NOTES
SUBJECTIVE:    Patient ID: Puma Oreilly is a 63 y.o. female.    Chief Complaint: Annual Exam (Brought bottles// wants to discuss flu and pna vasccine//pt refuses dxa// wants cologuard// brought list on complaints-MJ)    This is a 63-year-old female who presents today for annual checkup.  Has not been seen in our clinic in over 3 years. Recent history significant for SVT that came on after hernia repair with Dr. Gonzalez. Had zio patch monitor completed. Started on metoprolol but has been unable tolerate due to weakness. Recent eval with Dr. Romero- conservative management now. On rhythmol BID. Mentioned possible ablation if conservative measures fail. She will followup with Dr. Bernardo next month and planning to have another heart monitor.     She feels good overall from primary standpoint. Occasionally deals with tinnitus. A few falls and feeling off balance at times. Neurology and ENT saw her back in 2018. Normal MRI at that time. She is planning to followup with Dr. Jackson and Dr. Brunson.         Hospital Outpatient Visit on 07/25/2023   Component Date Value Ref Range Status    Holter Hookup Date 07/25/2023 [id number]   Final    Holter Hookup Time 07/25/2023 [id number]   Final    Holter Study End Date 07/25/2023 [id number]   Final    Holter Study End Time 07/25/2023 159410   Final    Holter Scan Date 07/25/2023 [id number]   Final    Sinus min HR 07/25/2023 48  bpm Final    Sinus max hr 07/25/2023 197  bpm Final    Sinus avg hr 07/25/2023 80  bpm Final    Event Monitor Day 07/25/2023 6   Final    Holter length hours 07/25/2023 17   Final    holter length minutes 07/25/2023 0   Final    holter length dec hours 07/25/2023 161.00   Final       Past Medical History:   Diagnosis Date    Breast, fat necrosis     bilateral    GERD (gastroesophageal reflux disease)     PONV (postoperative nausea and vomiting)     Wears glasses      Past Surgical History:   Procedure Laterality Date    ABDOMINAL SURGERY      abdominalplasty     AUGMENTATION OF BREAST      breast augmentation      breast reduction      CARPAL TUNNEL RELEASE      right    carpel tunnel       SECTION, CLASSIC      esphogeal stricture      GASTRIC BYPASS  2009    HYSTERECTOMY      PARTIAL NEPHRECTOMY      Rt Kidney    SHOULDER SURGERY      SMALL INTESTINE SURGERY      SPINAL FUSION  3/2015    ACDF  Dr Pichardo    THIGH LIFT      TONSILLECTOMY, ADENOIDECTOMY      TOTAL REDUCTION MAMMOPLASTY       Family History   Problem Relation Age of Onset    Diabetes Mother     Prostate cancer Father     Stroke Maternal Aunt     Cancer Maternal Grandmother         stomach    Lung cancer Maternal Grandmother     Diabetes Maternal Grandfather     Cancer Paternal Grandmother         lung       Marital Status:   Alcohol History:  reports no history of alcohol use.  Tobacco History:  reports that she has never smoked. She has never used smokeless tobacco.  Drug History:  reports no history of drug use.    Review of patient's allergies indicates:   Allergen Reactions    Hydrocodone Nausea Only       Current Outpatient Medications:     magnesium oxide (MAG-OX) 400 mg (241.3 mg magnesium) tablet, Take 1 tablet (400 mg total) by mouth once daily., Disp: 90 tablet, Rfl: 3    metoprolol tartrate (LOPRESSOR) 25 MG tablet, Take 0.5 tablets (12.5 mg total) by mouth 2 (two) times daily., Disp: 30 tablet, Rfl: 11    multivitamin with minerals tablet, Take 1 tablet by mouth once daily., Disp: , Rfl:     propafenone (RYTHMOL SR) 225 MG Cp12, Take 1 capsule (225 mg total) by mouth every 12 (twelve) hours., Disp: 60 capsule, Rfl: 11    Review of Systems   Constitutional:  Negative for appetite change, chills, fatigue, fever and unexpected weight change.   HENT:  Positive for tinnitus. Negative for congestion.    Respiratory:  Negative for cough, chest tightness and shortness of breath.    Cardiovascular:  Negative for chest pain and palpitations.   Gastrointestinal:  Negative for abdominal  "distention and abdominal pain.   Endocrine: Negative for cold intolerance and heat intolerance.   Genitourinary:  Negative for difficulty urinating and dysuria.   Musculoskeletal:  Negative for arthralgias and back pain.   Neurological:  Positive for dizziness. Negative for weakness and headaches.          Objective:      Vitals:    10/03/23 1129   BP: 118/72   Pulse: 60   Weight: 62.1 kg (137 lb)   Height: 4' 10.5" (1.486 m)     Physical Exam  Constitutional:       General: She is not in acute distress.     Appearance: She is well-developed.   HENT:      Head: Normocephalic and atraumatic.   Eyes:      Conjunctiva/sclera: Conjunctivae normal.      Pupils: Pupils are equal, round, and reactive to light.   Neck:      Thyroid: No thyromegaly.   Cardiovascular:      Rate and Rhythm: Normal rate and regular rhythm.      Heart sounds: Normal heart sounds.   Pulmonary:      Effort: Pulmonary effort is normal.      Breath sounds: Normal breath sounds.   Abdominal:      General: Bowel sounds are normal. There is no distension.      Palpations: Abdomen is soft.      Tenderness: There is no abdominal tenderness.   Musculoskeletal:         General: Normal range of motion.      Cervical back: Normal range of motion and neck supple.   Skin:     General: Skin is warm and dry.      Findings: No erythema.   Neurological:      Mental Status: She is alert and oriented to person, place, and time.      Cranial Nerves: No cranial nerve deficit.           Assessment:       1. Special screening for malignant neoplasms, colon    2. Screening for osteoporosis    3. Menopause    4. Encounter for long-term (current) use of other medications    5. Vaccine for streptococcus pneumoniae and influenza    6. Need for influenza vaccination    7. SVT (supraventricular tachycardia)    8. Dizziness         Plan:       Special screening for malignant neoplasms, colon  Comments:  Agreeable to [facility] for colon screening.  Order placed " today.  Orders:  -     Cologuard Screening (Multitarget Stool DNA); Future; Expected date: 10/03/2023    Screening for osteoporosis  Comments:  DEXA due to be updated.  Order placed today  Orders:  -     DXA Bone Density Axial Skeleton 1 or more sites; Future; Expected date: 10/03/2023    Menopause  -     DXA Bone Density Axial Skeleton 1 or more sites; Future; Expected date: 10/03/2023    Encounter for long-term (current) use of other medications  Comments:  Labs today for ongoing patient care.  Patient has not been seen in 3 years with our practice  Orders:  -     CBC Without Differential; Future; Expected date: 10/03/2023  -     Comprehensive Metabolic Panel; Future; Expected date: 10/03/2023  -     Lipid Panel; Future; Expected date: 10/03/2023  -     TSH w/reflex to FT4; Future; Expected date: 10/03/2023  -     Urinalysis, Reflex to Urine Culture Urine, Clean Catch; Future; Expected date: 10/03/2023    Vaccine for streptococcus pneumoniae and influenza  -     (In Office Administered) Pneumococcal Conjugate Vaccine (20 Valent) (IM) (Preferred)    Need for influenza vaccination  Comments:  Agrees to flu and pneumonia 20 vaccines today  Orders:  -     Influenza (FLUAD) - Quadrivalent (Adjuvanted) *Preferred* (65+) (PF)    SVT (supraventricular tachycardia)  Comments:  Keep follow-up as scheduled with Cardiology.  Dr. Bernardo visit scheduled in 4 weeks.  Seems to be responding well to Rythmol    Dizziness  Comments:  And tinnitus.  Reviewed prior workup with ENT and Neurology.  She is planning to see Dr. Brunson for updated MRI      No follow-ups on file.        10/3/2023 Andrea Rogers PA-C

## 2023-10-05 ENCOUNTER — TELEPHONE (OUTPATIENT)
Dept: FAMILY MEDICINE | Facility: CLINIC | Age: 63
End: 2023-10-05

## 2023-10-05 NOTE — TELEPHONE ENCOUNTER
----- Message from Daisy De Jesus sent at 10/5/2023  4:01 PM CDT -----  Pt needs to reschedule her 12/21 appt to 12/25 - 12/29. Pt #[phone]

## 2023-10-06 NOTE — TELEPHONE ENCOUNTER
----- Message from Daisy De Jesus sent at 10/5/2023  4:06 PM CDT -----  Pt needs to cancel the 12/21 appointment and needs to reschedule for 1/5/24. Pt #[phone]    
LMOR for a  call back. Appt canceled as requested.   
LMOR for call back.   
Yes

## 2023-10-09 ENCOUNTER — TELEPHONE (OUTPATIENT)
Dept: FAMILY MEDICINE | Facility: CLINIC | Age: 63
End: 2023-10-09

## 2023-10-09 NOTE — TELEPHONE ENCOUNTER
----- Message from Pratibha Cruz sent at 10/9/2023  2:40 PM CDT -----  Pt is calling back to reschedule to jan 5 if possible   [phone]

## 2023-10-16 ENCOUNTER — HOSPITAL ENCOUNTER (OUTPATIENT)
Dept: RADIOLOGY | Facility: HOSPITAL | Age: 63
Discharge: HOME | End: 2023-10-16
Attending: PHYSICIAN ASSISTANT
Payer: COMMERCIAL

## 2023-10-16 DIAGNOSIS — Z13.820 SCREENING FOR OSTEOPOROSIS: ICD-10-CM

## 2023-10-16 DIAGNOSIS — Z78.0 MENOPAUSE: ICD-10-CM

## 2023-10-16 PROCEDURE — 77080 DXA BONE DENSITY AXIAL: CPT | Mod: TC,PO

## 2023-10-17 ENCOUNTER — TELEPHONE (OUTPATIENT)
Dept: FAMILY MEDICINE | Facility: CLINIC | Age: 63
End: 2023-10-17

## 2023-10-17 NOTE — TELEPHONE ENCOUNTER
----- Message from Pratibha Cruz sent at 10/17/2023 12:09 PM CDT -----  Pt would like the results of her bone scan done yesterday   [phone]

## 2023-10-23 ENCOUNTER — TELEPHONE (OUTPATIENT)
Dept: FAMILY MEDICINE | Facility: CLINIC | Age: 63
End: 2023-10-23

## 2023-10-23 NOTE — TELEPHONE ENCOUNTER
----- Message from Daisy De Jesus sent at 10/23/2023  2:03 PM CDT -----  Pt wants the results of her bone density scan. Leave a detailed message. Pt #[phone]

## 2023-11-01 ENCOUNTER — OFFICE VISIT (OUTPATIENT)
Dept: PODIATRY | Facility: CLINIC | Age: 63
End: 2023-11-01
Payer: COMMERCIAL

## 2023-11-01 VITALS — BODY MASS INDEX: 28.76 KG/M2 | WEIGHT: 137 LBS | HEIGHT: 58 IN

## 2023-11-01 DIAGNOSIS — M72.2 PLANTAR FASCIITIS: Primary | ICD-10-CM

## 2023-11-01 DIAGNOSIS — M79.671 PAIN IN BOTH FEET: ICD-10-CM

## 2023-11-01 DIAGNOSIS — M79.672 PAIN OF BOTH HEELS: ICD-10-CM

## 2023-11-01 DIAGNOSIS — M79.671 PAIN OF BOTH HEELS: ICD-10-CM

## 2023-11-01 DIAGNOSIS — M79.672 PAIN IN BOTH FEET: ICD-10-CM

## 2023-11-01 PROCEDURE — 1159F MED LIST DOCD IN RCRD: CPT | Mod: CPTII,S$GLB,, | Performed by: PODIATRIST

## 2023-11-01 PROCEDURE — 20550 PR INJECT TENDON SHEATH/LIGAMENT: ICD-10-PCS | Mod: 50,S$GLB,, | Performed by: PODIATRIST

## 2023-11-01 PROCEDURE — 1101F PT FALLS ASSESS-DOCD LE1/YR: CPT | Mod: CPTII,S$GLB,, | Performed by: PODIATRIST

## 2023-11-01 PROCEDURE — 3288F FALL RISK ASSESSMENT DOCD: CPT | Mod: CPTII,S$GLB,, | Performed by: PODIATRIST

## 2023-11-01 PROCEDURE — 1160F PR REVIEW ALL MEDS BY PRESCRIBER/CLIN PHARMACIST DOCUMENTED: ICD-10-PCS | Mod: CPTII,S$GLB,, | Performed by: PODIATRIST

## 2023-11-01 PROCEDURE — 99999 PR PBB SHADOW E&M-EST. PATIENT-LVL III: CPT | Mod: PBBFAC,,, | Performed by: PODIATRIST

## 2023-11-01 PROCEDURE — 1159F PR MEDICATION LIST DOCUMENTED IN MEDICAL RECORD: ICD-10-PCS | Mod: CPTII,S$GLB,, | Performed by: PODIATRIST

## 2023-11-01 PROCEDURE — 3008F PR BODY MASS INDEX (BMI) DOCUMENTED: ICD-10-PCS | Mod: CPTII,S$GLB,, | Performed by: PODIATRIST

## 2023-11-01 PROCEDURE — 3008F BODY MASS INDEX DOCD: CPT | Mod: CPTII,S$GLB,, | Performed by: PODIATRIST

## 2023-11-01 PROCEDURE — 1101F PR PT FALLS ASSESS DOC 0-1 FALLS W/OUT INJ PAST YR: ICD-10-PCS | Mod: CPTII,S$GLB,, | Performed by: PODIATRIST

## 2023-11-01 PROCEDURE — 1160F RVW MEDS BY RX/DR IN RCRD: CPT | Mod: CPTII,S$GLB,, | Performed by: PODIATRIST

## 2023-11-01 PROCEDURE — 99203 PR OFFICE/OUTPT VISIT, NEW, LEVL III, 30-44 MIN: ICD-10-PCS | Mod: 25,S$GLB,, | Performed by: PODIATRIST

## 2023-11-01 PROCEDURE — 99203 OFFICE O/P NEW LOW 30 MIN: CPT | Mod: 25,S$GLB,, | Performed by: PODIATRIST

## 2023-11-01 PROCEDURE — 20550 NJX 1 TENDON SHEATH/LIGAMENT: CPT | Mod: 50,S$GLB,, | Performed by: PODIATRIST

## 2023-11-01 PROCEDURE — 99999 PR PBB SHADOW E&M-EST. PATIENT-LVL III: ICD-10-PCS | Mod: PBBFAC,,, | Performed by: PODIATRIST

## 2023-11-01 PROCEDURE — 3288F PR FALLS RISK ASSESSMENT DOCUMENTED: ICD-10-PCS | Mod: CPTII,S$GLB,, | Performed by: PODIATRIST

## 2023-11-01 RX ORDER — METHYLPREDNISOLONE ACETATE 40 MG/ML
20 INJECTION, SUSPENSION INTRA-ARTICULAR; INTRALESIONAL; INTRAMUSCULAR; SOFT TISSUE
Status: COMPLETED | OUTPATIENT
Start: 2023-11-01 | End: 2023-11-01

## 2023-11-01 RX ORDER — DEXAMETHASONE SODIUM PHOSPHATE 4 MG/ML
4 INJECTION, SOLUTION INTRA-ARTICULAR; INTRALESIONAL; INTRAMUSCULAR; INTRAVENOUS; SOFT TISSUE
Status: COMPLETED | OUTPATIENT
Start: 2023-11-01 | End: 2023-11-01

## 2023-11-01 RX ORDER — BUPIVACAINE HYDROCHLORIDE 5 MG/ML
1.5 INJECTION, SOLUTION PERINEURAL
Status: COMPLETED | OUTPATIENT
Start: 2023-11-01 | End: 2023-11-01

## 2023-11-01 RX ORDER — DICLOFENAC SODIUM 10 MG/G
GEL TOPICAL 4 TIMES DAILY
COMMUNITY
Start: 2023-10-30

## 2023-11-01 RX ORDER — AZELASTINE 1 MG/ML
SPRAY, METERED NASAL
COMMUNITY
Start: 2023-10-30

## 2023-11-01 RX ORDER — BENZONATATE 100 MG/1
100 CAPSULE ORAL 3 TIMES DAILY
COMMUNITY
Start: 2023-10-30

## 2023-11-01 RX ADMIN — METHYLPREDNISOLONE ACETATE 20 MG: 40 INJECTION, SUSPENSION INTRA-ARTICULAR; INTRALESIONAL; INTRAMUSCULAR; SOFT TISSUE at 06:11

## 2023-11-01 RX ADMIN — DEXAMETHASONE SODIUM PHOSPHATE 4 MG: 4 INJECTION, SOLUTION INTRA-ARTICULAR; INTRALESIONAL; INTRAMUSCULAR; INTRAVENOUS; SOFT TISSUE at 06:11

## 2023-11-01 RX ADMIN — BUPIVACAINE HYDROCHLORIDE 7.5 MG: 5 INJECTION, SOLUTION PERINEURAL at 06:11

## 2023-11-01 NOTE — PROGRESS NOTES
"  [address] [facility] [address]  [place] [address]  Phone: [phone]   [phone]    Patient's PCP:Venu Byrne MD  Referring Provider: Aaareferral Self    Subjective:      Chief Complaint:: Foot Pain (Pain in the heels )    Foot Pain  Pertinent negatives include no abdominal pain, arthralgias, chest pain, chills, coughing, fatigue, fever, headaches, joint swelling, myalgias, nausea, neck pain, numbness, rash or weakness.     Puma Oreilly is a 63 y.o. female who presents today with a complaint of bilateral heel pain . The current episode started about a month.  The symptoms include stinging and burning. Probable cause of complaint unknown.  The symptoms are aggravated by prolonged walking and standing. The problem has stayed the same. Treatment to date have included voltaren and soaking in the tub which provided some relief.         Vitals:    23 1610   Weight: 62.1 kg (137 lb)   Height: 4' 10" (1.473 m)   PainSc:   5      Shoe Size: 6.5    Past Surgical History:   Procedure Laterality Date    ABDOMINAL SURGERY      abdominalplasty    AUGMENTATION OF BREAST      breast augmentation      breast reduction      CARPAL TUNNEL RELEASE      right    carpel tunnel       SECTION, CLASSIC      esphogeal stricture      GASTRIC BYPASS  2009    HYSTERECTOMY      PARTIAL NEPHRECTOMY      Rt Kidney    SHOULDER SURGERY      SMALL INTESTINE SURGERY      SPINAL FUSION  3/2015    ACDF  Dr Pichardo    THIGH LIFT      TONSILLECTOMY, ADENOIDECTOMY      TOTAL REDUCTION MAMMOPLASTY       Past Medical History:   Diagnosis Date    Breast, fat necrosis     bilateral    GERD (gastroesophageal reflux disease)     PONV (postoperative nausea and vomiting)     Wears glasses      Family History   Problem Relation Age of Onset    Diabetes Mother     Prostate cancer Father     Stroke Maternal Aunt     Cancer Maternal Grandmother         stomach    Lung cancer Maternal Grandmother     Diabetes Maternal Grandfather "     Cancer Paternal Grandmother         lung        Social History:   Marital Status:   Alcohol History:  reports no history of alcohol use.  Tobacco History:  reports that she has never smoked. She has never used smokeless tobacco.  Drug History:  reports no history of drug use.    Review of patient's allergies indicates:   Allergen Reactions    Hydrocodone Nausea Only    Oxycodone        Current Outpatient Medications   Medication Sig Dispense Refill    benzonatate (TESSALON) 100 MG capsule Take 100 mg by mouth 3 (three) times daily.      diclofenac sodium (VOLTAREN) 1 % Gel Apply topically 4 (four) times daily.      azelastine (ASTELIN) 137 mcg (0.1 %) nasal spray SMARTSIG:Both Nares      magnesium oxide (MAG-OX) 400 mg (241.3 mg magnesium) tablet Take 1 tablet (400 mg total) by mouth once daily. 90 tablet 3    metoprolol tartrate (LOPRESSOR) 25 MG tablet Take 0.5 tablets (12.5 mg total) by mouth 2 (two) times daily. 30 tablet 11    multivitamin with minerals tablet Take 1 tablet by mouth once daily.      propafenone (RYTHMOL SR) 225 MG Cp12 Take 1 capsule (225 mg total) by mouth every 12 (twelve) hours. 60 capsule 11     No current facility-administered medications for this visit.       Review of Systems   Constitutional:  Negative for chills, fatigue, fever and unexpected weight change.   HENT:  Negative for hearing loss and trouble swallowing.    Eyes:  Negative for photophobia and visual disturbance.   Respiratory:  Negative for cough, shortness of breath and wheezing.    Cardiovascular:  Negative for chest pain, palpitations and leg swelling.   Gastrointestinal:  Negative for abdominal pain and nausea.   Genitourinary:  Negative for dysuria and frequency.   Musculoskeletal:  Negative for arthralgias, back pain, gait problem, joint swelling, myalgias and neck pain.   Skin:  Negative for rash and wound.   Neurological:  Negative for tremors, seizures, weakness, numbness and headaches.   Hematological:   Does not bruise/bleed easily.         Objective:        Physical Exam:   Foot Exam    General  General Appearance: appears stated age and healthy   Orientation: alert and oriented to person, place, and time   Affect: appropriate   Gait: unimpaired       Right Foot/Ankle     Inspection and Palpation  Ecchymosis: none  Tenderness: plantar fascia   Swelling: none   Arch: normal  Skin Exam: skin intact; no drainage, no ulcer and no erythema   Neurovascular  Dorsalis pedis: 2+  Posterior tibial: 2+  Capillary Refill: 2+  Varicose veins: not present  Saphenous nerve sensation: normal  Tibial nerve sensation: normal  Superficial peroneal nerve sensation: normal  Deep peroneal nerve sensation: normal  Sural nerve sensation: normal    Edema  Type of edema: non-pitting    Muscle Strength  Ankle dorsiflexion: 5  Ankle plantar flexion: 5  Ankle inversion: 5  Ankle eversion: 5  Great toe extension: 5  Great toe flexion: 5    Range of Motion    Normal right ankle ROM    Tests  Anterior drawer: negative   Talar tilt: negative   PT Tinel's sign: negative    Paresthesia: negative  Comments  Pain on palpation of the central plantar heel. No pain present  with side to side compression of the calcaneus. Negative tinnel's sign  at the tarsal tunnel. Negative Ferrell's nerve pain. Negative Calcaneal nerve pain. No soft tissue masses. Pain absent  with dorsiflexion of the ankle. No edema, erythema, or ecchymosis noted.     Left Foot/Ankle      Inspection and Palpation  Ecchymosis: none  Tenderness: plantar fascia   Swelling: none   Arch: normal  Skin Exam: skin intact; no drainage, no ulcer and no erythema   Neurovascular  Dorsalis pedis: 2+  Posterior tibial: 2+  Capillary refill: 2+  Varicose veins: not present  Saphenous nerve sensation: normal  Tibial nerve sensation: normal  Superficial peroneal nerve sensation: normal  Deep peroneal nerve sensation: normal  Sural nerve sensation: normal    Edema  Type of edema: non-pitting    Muscle  Strength  Ankle dorsiflexion: 5  Ankle plantar flexion: 5  Ankle inversion: 5  Ankle eversion: 5  Great toe extension: 5  Great toe flexion: 5    Range of Motion    Normal left ankle ROM    Tests  Anterior drawer: negative   Talar tilt: negative   PT Tinel's sign: negative  Paresthesia: negative  Comments  Pain on palpation of the central plantar heel. No pain present  with side to side compression of the calcaneus. Negative tinnel's sign  at the tarsal tunnel. Negative Ferrell's nerve pain. Negative Calcaneal nerve pain. No soft tissue masses. Pain absent  with dorsiflexion of the ankle. No edema, erythema, or ecchymosis noted.       Physical Exam  Cardiovascular:      Pulses:           Dorsalis pedis pulses are 2+ on the right side and 2+ on the left side.        Posterior tibial pulses are 2+ on the right side and 2+ on the left side.   Feet:      Right foot:      Skin integrity: No ulcer or erythema.      Left foot:      Skin integrity: No ulcer or erythema.               Right Ankle/Foot Exam     Range of Motion   The patient has normal right ankle ROM.    Comments:  Pain on palpation of the central plantar heel. No pain present  with side to side compression of the calcaneus. Negative tinnel's sign  at the tarsal tunnel. Negative Ferrell's nerve pain. Negative Calcaneal nerve pain. No soft tissue masses. Pain absent  with dorsiflexion of the ankle. No edema, erythema, or ecchymosis noted.     Left Ankle/Foot Exam     Range of Motion   The patient has normal left ankle ROM.     Comments:  Pain on palpation of the central plantar heel. No pain present  with side to side compression of the calcaneus. Negative tinnel's sign  at the tarsal tunnel. Negative Ferrell's nerve pain. Negative Calcaneal nerve pain. No soft tissue masses. Pain absent  with dorsiflexion of the ankle. No edema, erythema, or ecchymosis noted.         Muscle Strength   Right Lower Extremity   Ankle Dorsiflexion:  5   Plantar flexion:  5/5  Left  Lower Extremity   Ankle Dorsiflexion:  5   Plantar flexion:  5/5     Vascular Exam     Right Pulses  Dorsalis Pedis:      2+  Posterior Tibial:      2+        Left Pulses  Dorsalis Pedis:      2+  Posterior Tibial:      2+           Imaging: none            Assessment:       1. Plantar fasciitis    2. Pain in both feet    3. Pain of both heels      Plan:   Plantar fasciitis  -     methylPREDNISolone acetate injection 20 mg  -     BUPivacaine injection 7.5 mg  -     dexAMETHasone injection 4 mg  -     methylPREDNISolone acetate injection 20 mg  -     BUPivacaine injection 7.5 mg  -     dexAMETHasone injection 4 mg    Pain in both feet  -     methylPREDNISolone acetate injection 20 mg  -     BUPivacaine injection 7.5 mg  -     dexAMETHasone injection 4 mg  -     methylPREDNISolone acetate injection 20 mg  -     BUPivacaine injection 7.5 mg  -     dexAMETHasone injection 4 mg    Pain of both heels      Follow up if symptoms worsen or fail to improve.    Discussed different treatment options for heel pain. I gave written and verbal instructions on heel cord stretching and this was demonstrated for the patient. Patient expressed understanding. Discussed wearing appropriate shoe gear and avoiding flats, slippers, sandals, barefoot, and sockfeet. Recommended arch supports. My recommendation for OTC supports is Spenco polysorb replacement insoles or patient may elect more aggressive treatment with prescription arch supports. We also discussed cortisone injections and NSAID therapy.         Procedures Informed consent was obtained.  Time-out was called.  The area was prepped with alcohol, and a steroid injection was performed at left plantar fascia using 1.5 cc of 0.5% Marcaine w/out epi, 1 cc Dexamethasone, 0.5 cc Methylprednisolone. Patient tolerated the procedure well.       Informed consent was obtained.  Time-out was called.  The area was prepped with alcohol, and a steroid injection was performed at right plantar  fascia using 1.5 cc of 0.5% Marcaine w/out epi, 1 cc Dexamethasone, 0.5 cc Methylprednisolone. Patient tolerated the procedure well.               Counseling:     I provided patient education verbally regarding:   Patient diagnosis, treatment options, as well as alternatives, risks, and benefits.     This note was created using Dragon voice recognition software that occasionally misinterpreted phrases or words.

## 2023-11-01 NOTE — PATIENT INSTRUCTIONS

## 2023-11-10 LAB — NONINV COLON CA DNA+OCC BLD SCRN STL QL: NEGATIVE

## 2023-11-14 ENCOUNTER — TELEPHONE (OUTPATIENT)
Dept: FAMILY MEDICINE | Facility: CLINIC | Age: 63
End: 2023-11-14

## 2023-11-14 NOTE — TELEPHONE ENCOUNTER
----- Message from Daisy De Jesus sent at 11/14/2023  2:32 PM CST -----  Pt wants her cologuard results. Pt #[phone]

## 2023-11-15 ENCOUNTER — TELEPHONE (OUTPATIENT)
Dept: FAMILY MEDICINE | Facility: CLINIC | Age: 63
End: 2023-11-15

## 2023-11-15 NOTE — TELEPHONE ENCOUNTER
----- Message from Naty Ugrate MA sent at 11/15/2023  2:51 PM CST -----    ----- Message -----  From: Pratibha Cruz  Sent: 11/15/2023   2:49 PM CST  To: Venu Byrne Staff    -1:28- pt would like the results of her cologaurd test   [phone]

## 2023-11-16 ENCOUNTER — TELEPHONE (OUTPATIENT)
Dept: FAMILY MEDICINE | Facility: CLINIC | Age: 63
End: 2023-11-16

## 2023-11-16 NOTE — TELEPHONE ENCOUNTER
----- Message from Radha Rodriguez LPN sent at 11/16/2023  1:54 PM CST -----    ----- Message -----  From: Pratibha Cruz  Sent: 11/16/2023   1:45 PM CST  To: Venu Byrne [facility]-1:39- please leave a message on her answering machine with cologaurd results   [phone]

## 2023-11-16 NOTE — TELEPHONE ENCOUNTER
Left detailed message informing pt that get cologaurd results here negative, gale would like her to repeat in 3 years. If any questions please give us a call back.

## 2023-11-22 ENCOUNTER — TELEPHONE (OUTPATIENT)
Dept: CARDIOLOGY | Facility: CLINIC | Age: 63
End: 2023-11-22
Payer: COMMERCIAL

## 2023-11-22 NOTE — TELEPHONE ENCOUNTER
11/22/2023    Received the [facility] results on the patient.  Attempted to contact her, had to leave a msg on her voice mail that Dr Romero is going to read this report.  Follow-up with Dr Romero.

## 2023-11-22 NOTE — TELEPHONE ENCOUNTER
11/22/23    Received msg from [employer] this am, spoke with Kendy.  Patient is having episodes of significant SVT with high rates.  Placed in Dr Romero's basket for review.

## 2023-11-29 ENCOUNTER — TELEPHONE (OUTPATIENT)
Dept: CARDIOLOGY | Facility: CLINIC | Age: 63
End: 2023-11-29
Payer: COMMERCIAL

## 2023-11-29 NOTE — TELEPHONE ENCOUNTER
----- Message from Ijeoma Celis, Patient Care Assistant sent at 11/29/2023  1:43 PM CST -----  Regarding: results  Contact: pt  Type:  Test Results    Who Called:  pt     Name of Test (Lab/Mammo/Etc):  Holter monitor     Date of Test:  11/7/23    Ordering Provider:  Az DUBOIS    Where the test was performed:  [facility]    Best Call Back Number:  [phone]    Additional Information:  please call pt to advise. Thanks!

## 2023-12-04 ENCOUNTER — TELEPHONE (OUTPATIENT)
Dept: ELECTROPHYSIOLOGY | Facility: CLINIC | Age: 63
End: 2023-12-04
Payer: COMMERCIAL

## 2023-12-04 NOTE — TELEPHONE ENCOUNTER
No answer, left message.    ----- Message from Christy Gabriel sent at 12/4/2023  2:38 PM CST -----  Regarding: ablation  Pt is calling to schedule ablation and can be reached at [phone].    Thank you

## 2023-12-13 ENCOUNTER — TELEPHONE (OUTPATIENT)
Dept: ELECTROPHYSIOLOGY | Facility: CLINIC | Age: 63
End: 2023-12-13
Payer: COMMERCIAL

## 2023-12-13 NOTE — TELEPHONE ENCOUNTER
----- Message from Jamilah Miller sent at 12/13/2023  4:43 PM CST -----  Regarding: Missed call  Pt [phone] returning missed call and she says you can call her at 8:00 a.m in the morning if you're gone for the day.    Thanks

## 2023-12-14 ENCOUNTER — TELEPHONE (OUTPATIENT)
Dept: FAMILY MEDICINE | Facility: CLINIC | Age: 63
End: 2023-12-14

## 2023-12-14 NOTE — TELEPHONE ENCOUNTER
Never used [facility], would like to stick with [facility], so will ask  to add [facility] to [facility]. Vitor's orders from October still valid

## 2023-12-14 NOTE — TELEPHONE ENCOUNTER
Spoke to patient that fasting lab and urine is due a week prior to visit, orders at [facility], encouraged water. Advised she can take morning meds that do not need to be taken with food.  Said she spoke to our office and we told her ok to wait for labs until February because she is having ablation and getting labs then for that. Will make sure with that physician orders are combined. Wants to reschedule appt to after February, but wanted me to be sure this is ok with Dr Byrne.

## 2023-12-14 NOTE — TELEPHONE ENCOUNTER
----- Message from Mariangel Ordonez sent at 12/14/2023  2:30 PM CST -----  The patient is having a procedure  on 02/09/2024 by Dr. Maharaj. Andrea wants lab work can he just add  orders in the computer and she can have them the same time she gets her other labs. She has her labs done at [facility].  Dr. JENN Maharaj  is in with Dr. Bernardo. Pt's # [phone]

## 2023-12-15 NOTE — TELEPHONE ENCOUNTER
Chris beasley, no availability with dr. Byrne in February - will have to r/s with gale if she can't keep the January ov.

## 2023-12-18 ENCOUNTER — PATIENT MESSAGE (OUTPATIENT)
Dept: ELECTROPHYSIOLOGY | Facility: CLINIC | Age: 63
End: 2023-12-18
Payer: COMMERCIAL

## 2023-12-18 ENCOUNTER — TELEPHONE (OUTPATIENT)
Dept: FAMILY MEDICINE | Facility: CLINIC | Age: 63
End: 2023-12-18
Payer: COMMERCIAL

## 2023-12-18 NOTE — TELEPHONE ENCOUNTER
Patient's  was seen in the office today, he brought a letter from Ms Ayala asking us to give her a copy of her lab orders for our office, and that we do not duplicate what Dr Romero and Dr Bernardo are ordering for upcoming ablation. Advised Mr Oreilly that I do not see any orders in our system for either of those doctors, but to give the lab our orders along with what Dr Romero and Az are ordering and ask them not to duplicate any. He will let Ms Lucy know.

## 2023-12-18 NOTE — TELEPHONE ENCOUNTER
Left message woith  that we were calling to reschedule her appt. He will have her call back   Patient presents with:  Cough    (J45.901) Exacerbation of asthma, unspecified asthma severity, unspecified whether persistent  (primary encounter diagnosis)  Comment:   Plan: methylPREDNISolone (MEDROL DOSEPAK) 4 MG tablet        therapy pack            (J30.2) Seasonal allergic rhinitis, unspecified trigger  Comment:   Plan: cetirizine (ZYRTEC) 10 MG tablet        Take daily for the next month      Continue advair and albuterol inhalers.  Albuterol nebulizer as needed.     Follow up should symptoms persist or worsen.          SUBJECTIVE:   Yael Johnson is a 28 year old female who presents today with cough for the past 3 days.  She has asthma and has been using her advair and albuterol.      Denies any fevers.   Denies any chest pain .      Throat feels irritated when she coughs.      No fevers.        Past Medical History:   Diagnosis Date     Abnormal uterine bleeding (AUB) 11/28/2019     Asthma      Enlarged tonsils      History of edema 10/23/2020     Leg fatigue 10/23/2020     Low ferritin 4/11/2019     Palpitations 4/11/2019     Uterine polyp 11/28/2019         Current Outpatient Medications   Medication Sig Dispense Refill     Multiple Vitamins-Iron (DAILY-KATLYN/IRON/BETA-CAROTENE) TABS TAKE 1 TABLET BY MOUTH DAILY. (Patient not taking: Reported on 10/19/2020) 30 tablet 7     Social History     Tobacco Use     Smoking status: Never Smoker     Smokeless tobacco: Never Used   Substance Use Topics     Alcohol use: Not on file     Family History   Problem Relation Age of Onset     Diabetes Mother      Diabetes Father          ROS:    10 point ROS of systems including Constitutional, Eyes, Respiratory, Cardiovascular, Gastroenterology, Genitourinary, Integumentary, Muscularskeletal, Psychiatric ,neurological were all negative except for pertinent positives noted in my HPI       OBJECTIVE:  /81   Pulse 95   Temp 97.6  F (36.4  C) (Tympanic)   Wt 135.7 kg (299 lb 3.2 oz)   LMP 05/31/2023 (Exact Date)    SpO2 98%   BMI 44.18 kg/m    Physical Exam:  GENERAL APPEARANCE: healthy, alert and no distress  EYES: EOMI,  PERRL, conjunctiva clear  HENT: ear canals and TM's normal.  Nose and mouth without ulcers, erythema or lesions  HENT: nasal turbinates boggy with bluish hue and rhinorrhea clear  NECK: supple, nontender, no lymphadenopathy  RESP: wheezing  CV: regular rates and rhythm, normal S1 S2, no murmur noted  NEURO: Normal strength and tone, sensory exam grossly normal,  normal speech and mentation  SKIN: no suspicious lesions or rashes

## 2023-12-20 ENCOUNTER — PATIENT MESSAGE (OUTPATIENT)
Dept: ELECTROPHYSIOLOGY | Facility: CLINIC | Age: 63
End: 2023-12-20
Payer: COMMERCIAL

## 2023-12-20 DIAGNOSIS — I47.10 SVT (SUPRAVENTRICULAR TACHYCARDIA): Primary | ICD-10-CM

## 2024-01-08 ENCOUNTER — PATIENT MESSAGE (OUTPATIENT)
Dept: ELECTROPHYSIOLOGY | Facility: CLINIC | Age: 64
End: 2024-01-08
Payer: COMMERCIAL

## 2024-01-10 ENCOUNTER — TELEPHONE (OUTPATIENT)
Dept: ELECTROPHYSIOLOGY | Facility: CLINIC | Age: 64
End: 2024-01-10
Payer: COMMERCIAL

## 2024-01-10 NOTE — TELEPHONE ENCOUNTER
----- Message from Carey Benitez MA sent at 1/10/2024  2:15 PM CST -----  Contact: self  Pt.is returning  your call.Please call [phone].needs labs done

## 2024-01-12 ENCOUNTER — PATIENT MESSAGE (OUTPATIENT)
Dept: ELECTROPHYSIOLOGY | Facility: CLINIC | Age: 64
End: 2024-01-12
Payer: COMMERCIAL

## 2024-01-22 ENCOUNTER — TELEPHONE (OUTPATIENT)
Dept: CARDIOLOGY | Facility: CLINIC | Age: 64
End: 2024-01-22
Payer: COMMERCIAL

## 2024-01-22 NOTE — TELEPHONE ENCOUNTER
----- Message from Citlali Rider MA sent at 1/22/2024  2:53 PM CST -----    ----- Message -----  From: Libertad Dickinson MA  Sent: 1/22/2024   2:51 PM CST  To: Heather Smith Staff    The patient would like to know if you have a early time on 2- if so please call [phone] please leave a message the patient said she schedule her lab on Feb 1 2024. Thank you

## 2024-01-26 ENCOUNTER — TELEPHONE (OUTPATIENT)
Dept: ELECTROPHYSIOLOGY | Facility: CLINIC | Age: 64
End: 2024-01-26
Payer: COMMERCIAL

## 2024-01-26 NOTE — TELEPHONE ENCOUNTER
----- Message from Jamilah Miller sent at 1/26/2024  2:11 PM CST -----  Regarding: Appt  Pt [phone]  calling in ref to her scheduled appt and procedure.    Thanks

## 2024-02-01 DIAGNOSIS — I47.10 SVT (SUPRAVENTRICULAR TACHYCARDIA): Primary | ICD-10-CM

## 2024-02-02 DIAGNOSIS — I47.10 SVT (SUPRAVENTRICULAR TACHYCARDIA): Primary | ICD-10-CM

## 2024-02-03 LAB
ALBUMIN SERPL-MCNC: 4.5 G/DL (ref 3.6–5.1)
ALBUMIN/GLOB SERPL: 2 (CALC) (ref 1–2.5)
ALP SERPL-CCNC: 91 U/L (ref 37–153)
ALT SERPL-CCNC: 16 U/L (ref 6–29)
APPEARANCE UR: CLEAR
APTT PPP: 31 SEC (ref 23–32)
AST SERPL-CCNC: 23 U/L (ref 10–35)
BACTERIA #/AREA URNS HPF: NORMAL /HPF
BACTERIA UR CULT: NORMAL
BASOPHILS # BLD AUTO: 42 CELLS/UL (ref 0–200)
BASOPHILS NFR BLD AUTO: 0.7 %
BILIRUB SERPL-MCNC: 0.3 MG/DL (ref 0.2–1.2)
BILIRUB UR QL STRIP: NEGATIVE
BUN SERPL-MCNC: 11 MG/DL (ref 7–25)
BUN SERPL-MCNC: 11 MG/DL (ref 7–25)
BUN/CREAT SERPL: ABNORMAL (CALC) (ref 6–22)
BUN/CREAT SERPL: ABNORMAL (CALC) (ref 6–22)
CALCIUM SERPL-MCNC: 8.9 MG/DL (ref 8.6–10.4)
CALCIUM SERPL-MCNC: 9.1 MG/DL (ref 8.6–10.4)
CHLORIDE SERPL-SCNC: 105 MMOL/L (ref 98–110)
CHLORIDE SERPL-SCNC: 106 MMOL/L (ref 98–110)
CHOLEST SERPL-MCNC: 190 MG/DL
CHOLEST/HDLC SERPL: 2.3 (CALC)
CO2 SERPL-SCNC: 24 MMOL/L (ref 20–32)
CO2 SERPL-SCNC: 26 MMOL/L (ref 20–32)
COLOR UR: YELLOW
CREAT SERPL-MCNC: 0.64 MG/DL (ref 0.5–1.05)
CREAT SERPL-MCNC: 0.65 MG/DL (ref 0.5–1.05)
EGFR: 98 ML/MIN/1.73M2
EGFR: 98 ML/MIN/1.73M2
EOSINOPHIL # BLD AUTO: 132 CELLS/UL (ref 15–500)
EOSINOPHIL NFR BLD AUTO: 2.2 %
ERYTHROCYTE [DISTWIDTH] IN BLOOD BY AUTOMATED COUNT: 13 % (ref 11–15)
ERYTHROCYTE [DISTWIDTH] IN BLOOD BY AUTOMATED COUNT: 13 % (ref 11–15)
GLOBULIN SER CALC-MCNC: 2.3 G/DL (CALC) (ref 1.9–3.7)
GLUCOSE SERPL-MCNC: 143 MG/DL (ref 65–139)
GLUCOSE SERPL-MCNC: 144 MG/DL (ref 65–139)
GLUCOSE UR QL STRIP: NEGATIVE
HCT VFR BLD AUTO: 36.7 % (ref 35–45)
HCT VFR BLD AUTO: 36.8 % (ref 35–45)
HDLC SERPL-MCNC: 82 MG/DL
HGB BLD-MCNC: 11.9 G/DL (ref 11.7–15.5)
HGB BLD-MCNC: 12 G/DL (ref 11.7–15.5)
HGB UR QL STRIP: NEGATIVE
HYALINE CASTS #/AREA URNS LPF: NORMAL /LPF
INR PPP: 1.1
KETONES UR QL STRIP: NEGATIVE
LDLC SERPL CALC-MCNC: 87 MG/DL (CALC)
LEUKOCYTE ESTERASE UR QL STRIP: NEGATIVE
LYMPHOCYTES # BLD AUTO: 1638 CELLS/UL (ref 850–3900)
LYMPHOCYTES NFR BLD AUTO: 27.3 %
MCH RBC QN AUTO: 26.3 PG (ref 27–33)
MCH RBC QN AUTO: 26.5 PG (ref 27–33)
MCHC RBC AUTO-ENTMCNC: 32.4 G/DL (ref 32–36)
MCHC RBC AUTO-ENTMCNC: 32.6 G/DL (ref 32–36)
MCV RBC AUTO: 81.2 FL (ref 80–100)
MCV RBC AUTO: 81.2 FL (ref 80–100)
MONOCYTES # BLD AUTO: 462 CELLS/UL (ref 200–950)
MONOCYTES NFR BLD AUTO: 7.7 %
NEUTROPHILS # BLD AUTO: 3726 CELLS/UL (ref 1500–7800)
NEUTROPHILS NFR BLD AUTO: 62.1 %
NITRITE UR QL STRIP: NEGATIVE
NONHDLC SERPL-MCNC: 108 MG/DL (CALC)
PH UR STRIP: 5.5 [PH] (ref 5–8)
PLATELET # BLD AUTO: 305 THOUSAND/UL (ref 140–400)
PLATELET # BLD AUTO: 316 THOUSAND/UL (ref 140–400)
PMV BLD REES-ECKER: 10.5 FL (ref 7.5–12.5)
PMV BLD REES-ECKER: 10.7 FL (ref 7.5–12.5)
POTASSIUM SERPL-SCNC: 4.1 MMOL/L (ref 3.5–5.3)
POTASSIUM SERPL-SCNC: 4.1 MMOL/L (ref 3.5–5.3)
PROT SERPL-MCNC: 6.8 G/DL (ref 6.1–8.1)
PROT UR QL STRIP: NEGATIVE
PROTHROMBIN TIME: 11.7 SEC (ref 9–11.5)
RBC # BLD AUTO: 4.52 MILLION/UL (ref 3.8–5.1)
RBC # BLD AUTO: 4.53 MILLION/UL (ref 3.8–5.1)
RBC #/AREA URNS HPF: NORMAL /HPF
SERVICE CMNT-IMP: NORMAL
SODIUM SERPL-SCNC: 138 MMOL/L (ref 135–146)
SODIUM SERPL-SCNC: 140 MMOL/L (ref 135–146)
SP GR UR STRIP: 1.01 (ref 1–1.03)
SQUAMOUS #/AREA URNS HPF: NORMAL /HPF
T4 SERPL-MCNC: 5.1 MCG/DL (ref 5.1–11.9)
TRIGL SERPL-MCNC: 115 MG/DL
WBC # BLD AUTO: 5.9 THOUSAND/UL (ref 3.8–10.8)
WBC # BLD AUTO: 6 THOUSAND/UL (ref 3.8–10.8)
WBC #/AREA URNS HPF: NORMAL /HPF

## 2024-02-07 ENCOUNTER — TELEPHONE (OUTPATIENT)
Dept: ELECTROPHYSIOLOGY | Facility: CLINIC | Age: 64
End: 2024-02-07
Payer: COMMERCIAL

## 2024-02-07 NOTE — TELEPHONE ENCOUNTER
Spoke to patient    CONFIRMED procedure arrival time of 9:00 AM    Reiterated instructions including:  -Directions to check in desk  -NPO after midnight night prior to procedure  -High importance of HOLDING Propafenone 5 days prior  -Pre-procedure LABS reviewed.  -Confirmed absence of implanted device/stimulator   -Confirmed no fever, cough, or shortness of breath in the past 30 days  -Confirmed no redness, rash, irritation, or yeast infection to groin area.   -Do not wear mascara day of procedure  -Bathe night prior and morning prior to procedure with Hibiclens solution or an antibacterial soap  -Reviewed current visitor policy    Patient verbalized understanding of above and appreciated the call.

## 2024-02-08 ENCOUNTER — ANESTHESIA EVENT (OUTPATIENT)
Dept: MEDSURG UNIT | Facility: HOSPITAL | Age: 64
End: 2024-02-08
Payer: COMMERCIAL

## 2024-02-08 NOTE — ANESTHESIA PREPROCEDURE EVALUATION
02/08/2024  Puma Oreilly is a 63 y.o., female.  Patient Active Problem List   Diagnosis    Adhesive capsulitis    Rotator cuff tear    History of shoulder surgery    DDD (degenerative disc disease), cervical    DDD (degenerative disc disease), lumbar    Bursitis of hip    Cervical spondylosis    Lumbar spondylosis    Bursitis/tendonitis, shoulder    Foraminal stenosis of cervical region    Osteophyte of cervical spine    Spinal stenosis in cervical region    Herniated lumbar intervertebral disc    Neural foraminal stenosis of lumbar spine    Lumbar spinal stenosis    Spondylolisthesis of cervical region    Myelopathy    Cervical myelopathy    Primary insomnia    Seasonal allergic rhinitis due to pollen    Other chest pain    Weight gain following gastric bypass surgery    Pleuritic chest pain    Hx of hernia repair    Abnormal heart rate    SVT (supraventricular tachycardia)           Pre-op Assessment    I have reviewed the Patient Summary Reports.       I have reviewed the Medications.     Review of Systems  Anesthesia Hx:  No problems with previous Anesthesia               Denies Personal Hx of Anesthesia complications.                    Hepatic/GI:     GERD      Gerd          Musculoskeletal:  Arthritis        Arthritis          Neurological:      Arthritis                               Physical Exam    Airway:  No airway management difficulties anticipated  Dental:  No active dental issues noted  Chest/Lungs:  Clear to auscultation    Heart:  Rate: Normal  Rhythm: Regular Rhythm  Sounds: Normal        Anesthesia Plan  Type of Anesthesia, risks & benefits discussed:    Anesthesia Type: Gen Natural Airway, Gen Supraglottic Airway  Informed Consent: Informed consent signed with the Patient and all parties understand the risks and agree with anesthesia plan.  All questions answered.   ASA Score:  3  Anesthesia Plan Notes: Chart reviewed. Patient seen and examined. Anesthesia plan discussed and questions answered. E-consent signed. Marquis Haynes MD    Ready For Surgery From Anesthesia Perspective.     .

## 2024-02-09 ENCOUNTER — HOSPITAL ENCOUNTER (OUTPATIENT)
Facility: HOSPITAL | Age: 64
Discharge: HOME | End: 2024-02-09
Attending: INTERNAL MEDICINE | Admitting: INTERNAL MEDICINE
Payer: COMMERCIAL

## 2024-02-09 ENCOUNTER — ANESTHESIA (OUTPATIENT)
Dept: MEDSURG UNIT | Facility: HOSPITAL | Age: 64
End: 2024-02-09
Payer: COMMERCIAL

## 2024-02-09 VITALS
HEART RATE: 89 BPM | SYSTOLIC BLOOD PRESSURE: 140 MMHG | OXYGEN SATURATION: 98 % | RESPIRATION RATE: 18 BRPM | DIASTOLIC BLOOD PRESSURE: 75 MMHG | TEMPERATURE: 98 F

## 2024-02-09 DIAGNOSIS — I47.19 ATRIAL TACHYCARDIA: Primary | ICD-10-CM

## 2024-02-09 DIAGNOSIS — I49.9 ARRHYTHMIA: ICD-10-CM

## 2024-02-09 DIAGNOSIS — I47.10 SVT (SUPRAVENTRICULAR TACHYCARDIA): ICD-10-CM

## 2024-02-09 LAB
OHS QRS DURATION: 70 MS
OHS QRS DURATION: 70 MS
OHS QTC CALCULATION: 394 MS
OHS QTC CALCULATION: 414 MS

## 2024-02-09 PROCEDURE — 37000009 HC ANESTHESIA EA ADD 15 MINS: Performed by: INTERNAL MEDICINE

## 2024-02-09 PROCEDURE — 25000003 PHARM REV CODE 250: Performed by: INTERNAL MEDICINE

## 2024-02-09 PROCEDURE — C1894 INTRO/SHEATH, NON-LASER: HCPCS | Performed by: INTERNAL MEDICINE

## 2024-02-09 PROCEDURE — 93010 ELECTROCARDIOGRAM REPORT: CPT | Mod: ,,, | Performed by: INTERNAL MEDICINE

## 2024-02-09 PROCEDURE — 93005 ELECTROCARDIOGRAM TRACING: CPT

## 2024-02-09 PROCEDURE — 93623 PRGRMD STIMJ&PACG IV RX NFS: CPT | Performed by: INTERNAL MEDICINE

## 2024-02-09 PROCEDURE — 63600175 PHARM REV CODE 636 W HCPCS: Performed by: INTERNAL MEDICINE

## 2024-02-09 PROCEDURE — D9220A PRA ANESTHESIA: Mod: ANES,,, | Performed by: ANESTHESIOLOGY

## 2024-02-09 PROCEDURE — 93655 ICAR CATH ABLTJ DSCRT ARRHYT: CPT | Mod: ,,, | Performed by: INTERNAL MEDICINE

## 2024-02-09 PROCEDURE — 93653 COMPRE EP EVAL TX SVT: CPT | Mod: ,,, | Performed by: INTERNAL MEDICINE

## 2024-02-09 PROCEDURE — C1732 CATH, EP, DIAG/ABL, 3D/VECT: HCPCS | Performed by: INTERNAL MEDICINE

## 2024-02-09 PROCEDURE — 93010 ELECTROCARDIOGRAM REPORT: CPT | Mod: 76,,, | Performed by: INTERNAL MEDICINE

## 2024-02-09 PROCEDURE — 93655 ICAR CATH ABLTJ DSCRT ARRHYT: CPT | Performed by: INTERNAL MEDICINE

## 2024-02-09 PROCEDURE — 63600175 PHARM REV CODE 636 W HCPCS: Performed by: NURSE ANESTHETIST, CERTIFIED REGISTERED

## 2024-02-09 PROCEDURE — 93623 PRGRMD STIMJ&PACG IV RX NFS: CPT | Mod: 26,,, | Performed by: INTERNAL MEDICINE

## 2024-02-09 PROCEDURE — 25000003 PHARM REV CODE 250: Performed by: NURSE ANESTHETIST, CERTIFIED REGISTERED

## 2024-02-09 PROCEDURE — 27201423 OPTIME MED/SURG SUP & DEVICES STERILE SUPPLY: Performed by: INTERNAL MEDICINE

## 2024-02-09 PROCEDURE — 93653 COMPRE EP EVAL TX SVT: CPT | Performed by: INTERNAL MEDICINE

## 2024-02-09 PROCEDURE — 63600175 PHARM REV CODE 636 W HCPCS: Performed by: ANESTHESIOLOGY

## 2024-02-09 PROCEDURE — C1730 CATH, EP, 19 OR FEW ELECT: HCPCS | Performed by: INTERNAL MEDICINE

## 2024-02-09 PROCEDURE — D9220A PRA ANESTHESIA: Mod: CRNA,,, | Performed by: NURSE ANESTHETIST, CERTIFIED REGISTERED

## 2024-02-09 PROCEDURE — 37000008 HC ANESTHESIA 1ST 15 MINUTES: Performed by: INTERNAL MEDICINE

## 2024-02-09 RX ORDER — ACETAMINOPHEN 325 MG/1
650 TABLET ORAL EVERY 4 HOURS PRN
Status: DISCONTINUED | OUTPATIENT
Start: 2024-02-09 | End: 2024-02-09 | Stop reason: HOSPADM

## 2024-02-09 RX ORDER — HYDROMORPHONE HYDROCHLORIDE 1 MG/ML
0.2 INJECTION, SOLUTION INTRAMUSCULAR; INTRAVENOUS; SUBCUTANEOUS EVERY 5 MIN PRN
Status: DISCONTINUED | OUTPATIENT
Start: 2024-02-09 | End: 2024-02-09 | Stop reason: HOSPADM

## 2024-02-09 RX ORDER — ONDANSETRON HYDROCHLORIDE 2 MG/ML
4 INJECTION, SOLUTION INTRAVENOUS ONCE AS NEEDED
Status: COMPLETED | OUTPATIENT
Start: 2024-02-09 | End: 2024-02-09

## 2024-02-09 RX ORDER — METOPROLOL SUCCINATE 25 MG/1
25 TABLET, EXTENDED RELEASE ORAL DAILY
Qty: 90 TABLET | Refills: 3 | Status: SHIPPED | OUTPATIENT
Start: 2024-02-09 | End: 2024-05-06 | Stop reason: SDUPTHER

## 2024-02-09 RX ORDER — ASPIRIN 81 MG/1
81 TABLET ORAL DAILY
Qty: 30 TABLET | Refills: 0 | Status: SHIPPED | OUTPATIENT
Start: 2024-02-09 | End: 2025-02-08

## 2024-02-09 RX ORDER — LIDOCAINE HYDROCHLORIDE 20 MG/ML
INJECTION, SOLUTION INFILTRATION; PERINEURAL
Status: DISCONTINUED | OUTPATIENT
Start: 2024-02-09 | End: 2024-02-09 | Stop reason: HOSPADM

## 2024-02-09 RX ORDER — PROPOFOL 10 MG/ML
VIAL (ML) INTRAVENOUS
Status: DISCONTINUED | OUTPATIENT
Start: 2024-02-09 | End: 2024-02-09

## 2024-02-09 RX ORDER — SODIUM CHLORIDE 9 MG/ML
INJECTION, SOLUTION INTRAVENOUS CONTINUOUS PRN
Status: DISCONTINUED | OUTPATIENT
Start: 2024-02-09 | End: 2024-02-09

## 2024-02-09 RX ORDER — PROPOFOL 10 MG/ML
VIAL (ML) INTRAVENOUS CONTINUOUS PRN
Status: DISCONTINUED | OUTPATIENT
Start: 2024-02-09 | End: 2024-02-09

## 2024-02-09 RX ORDER — FENTANYL CITRATE 50 UG/ML
25 INJECTION, SOLUTION INTRAMUSCULAR; INTRAVENOUS EVERY 5 MIN PRN
Status: DISCONTINUED | OUTPATIENT
Start: 2024-02-09 | End: 2024-02-09 | Stop reason: HOSPADM

## 2024-02-09 RX ORDER — LIDOCAINE HYDROCHLORIDE 20 MG/ML
INJECTION INTRAVENOUS
Status: DISCONTINUED | OUTPATIENT
Start: 2024-02-09 | End: 2024-02-09

## 2024-02-09 RX ORDER — DIPHENHYDRAMINE HYDROCHLORIDE 50 MG/ML
25 INJECTION INTRAMUSCULAR; INTRAVENOUS EVERY 6 HOURS PRN
Status: DISCONTINUED | OUTPATIENT
Start: 2024-02-09 | End: 2024-02-09 | Stop reason: HOSPADM

## 2024-02-09 RX ADMIN — PROPOFOL 40 MG: 10 INJECTION, EMULSION INTRAVENOUS at 01:02

## 2024-02-09 RX ADMIN — PROPOFOL 30 MG: 10 INJECTION, EMULSION INTRAVENOUS at 01:02

## 2024-02-09 RX ADMIN — SODIUM CHLORIDE: 0.9 INJECTION, SOLUTION INTRAVENOUS at 12:02

## 2024-02-09 RX ADMIN — LIDOCAINE HYDROCHLORIDE 25 MG: 20 INJECTION INTRAVENOUS at 12:02

## 2024-02-09 RX ADMIN — PROPOFOL 30 MG: 10 INJECTION, EMULSION INTRAVENOUS at 12:02

## 2024-02-09 RX ADMIN — GLYCOPYRROLATE 0.2 MG: 0.2 INJECTION, SOLUTION INTRAMUSCULAR; INTRAVENOUS at 02:02

## 2024-02-09 RX ADMIN — PROPOFOL 150 MCG/KG/MIN: 10 INJECTION, EMULSION INTRAVENOUS at 12:02

## 2024-02-09 RX ADMIN — ONDANSETRON 4 MG: 2 INJECTION INTRAMUSCULAR; INTRAVENOUS at 04:02

## 2024-02-09 RX ADMIN — PROPOFOL 20 MG: 10 INJECTION, EMULSION INTRAVENOUS at 12:02

## 2024-02-09 RX ADMIN — ISOPROTERENOL HYDROCHLORIDE 0.5 MCG/MIN: 0.2 INJECTION, SOLUTION INTRAMUSCULAR; INTRAVENOUS at 01:02

## 2024-02-09 RX ADMIN — ACETAMINOPHEN 650 MG: 325 TABLET ORAL at 03:02

## 2024-02-09 NOTE — BRIEF OP NOTE
: Luis Romero MD  Date of Procedure: 02/09/2024    Post-operative diagnosis: AT    Procedure Performed: RFA to the RA septum, RA lateral wall, monet/SVC junction for treatment of AT     Description of Procedure: The patient was brought to the EP lab in the fasting state. Prepped and draped in sterile fashion. Safety timeout was performed. Sedation administered by anesthesia staff. Ultrasound guided venous access of the bilateral femoral veins was performed. HRA, HIS, and RV catheters placed via left femoral vein access. CS and Ablation catheters via right femoral vein access. Diagnostic EP study confirmed multiple atrial tachycardias localized to RA mid septum, RA mid monet, and RA high monet/SVC junction. RFA to the each focus for treatment of AT. Non inducible at end of study with 20 minutes of stimulation.     EBL: <10 mL    Specimens: none  Complications: no immediate    Joshua Newton MD  [facility]  Cardiovascular Disease, PGY-VI

## 2024-02-09 NOTE — H&P
[facility] [place]  Electrophysiology  H&P      Puma Oreilly   YOB: 1960   Medical Record Number: [id number]   Attending Physician:    Date of Admission: 02/09/2024       Hospital Day:  0  Current Principal Problem:  <principal problem not specified>      History     Cc: SVT     HPI  Ms Puma Oreilly is a 63 year old female with PMH of SVT who presents to [facility] for SVT ablation with Dr. Romero. She recently wore Zio patch which demonstrated frequent runs of SVT with variable AV block. She noted some occasional palpitations. She was offered EPS and ablation by Dr. Romero and agreed to proceed. She presents today for the aforementioned procedure.       Presenting EKG: NSR  CHADS-VASc: N/A  Anticoagulants: None   Antiarrhythmics: Propafenone 225 BID   LV EF: 60% (TTE 6/2022)  Pertinent labs: Hgb 11.9, INR 1.1, Cr 0.65        Medications - Outpatient  Prior to Admission medications    Medication Sig Start Date End Date Taking? Authorizing Provider   azelastine (ASTELIN) 137 mcg (0.1 %) nasal spray SMARTSIG:Both Nares 10/30/23   Provider, Historical   benzonatate (TESSALON) 100 MG capsule Take 100 mg by mouth 3 (three) times daily. 10/30/23   Provider, Historical   diclofenac sodium (VOLTAREN) 1 % Gel Apply topically 4 (four) times daily. 10/30/23   Provider, Historical   magnesium oxide (MAG-OX) 400 mg (241.3 mg magnesium) tablet Take 1 tablet (400 mg total) by mouth once daily. 8/14/23   Pratibha Rogel NP   metoprolol tartrate (LOPRESSOR) 25 MG tablet Take 0.5 tablets (12.5 mg total) by mouth 2 (two) times daily. 8/14/23 8/13/24  Pratibha Rogel NP   multivitamin with minerals tablet Take 1 tablet by mouth once daily.    Provider, Historical   propafenone (RYTHMOL SR) 225 MG Cp12 Take 1 capsule (225 mg total) by mouth every 12 (twelve) hours. 9/6/23 9/5/24  Luis Romero MD         Medications - Current  Scheduled Meds:  Continuous Infusions:  PRN Meds:.      Allergies  Review of  patient's allergies indicates:   Allergen Reactions    Hydrocodone Nausea Only    Oxycodone          Past Medical History  Past Medical History:   Diagnosis Date    Breast, fat necrosis     bilateral    GERD (gastroesophageal reflux disease)     PONV (postoperative nausea and vomiting)     Wears glasses          Past Surgical History  Past Surgical History:   Procedure Laterality Date    ABDOMINAL SURGERY      abdominalplasty    AUGMENTATION OF BREAST      breast augmentation      breast reduction      CARPAL TUNNEL RELEASE      right    carpel tunnel       SECTION, CLASSIC      esphogeal stricture      GASTRIC BYPASS  2009    HYSTERECTOMY      PARTIAL NEPHRECTOMY      Rt Kidney    SHOULDER SURGERY      SMALL INTESTINE SURGERY      SPINAL FUSION  3/2015    ACDF  Dr Pichardo    THIGH LIFT      TONSILLECTOMY, ADENOIDECTOMY      TOTAL REDUCTION MAMMOPLASTY           Social History  Social History     Socioeconomic History    Marital status:    Occupational History     Employer: [employer]   Tobacco Use    Smoking status: Never    Smokeless tobacco: Never   Substance and Sexual Activity    Alcohol use: No    Drug use: No         ROS  10 point ROS performed and negative except as stated in HPI     Physical Examination         Vital Signs             24 Hour VS Range    BP: ()/()   Arterial Line BP: ()/()   No intake or output data in the 24 hours ending 24 0740        Physical Exam:   Constitutional: no acute distress  HEENT: NCAT, EOMI, no scleral icterus  Cardiovascular: Regular rate and rhythm  Pulmonary: Normal respiratory effort   Abdomen: nontender, non-distended   Neuro: alert and oriented, no focal deficits  Extremities: warm, no edema   MSK: no deformities  Integument: intact, no rashes       Data       Recent Labs   Lab 24  1629 24  1635   WBC 5.9 6.0   HGB 12.0 11.9   HCT 36.8 36.7    305        Recent Labs   Lab 24  1629   INR 1.1        Recent  "Labs   Lab 02/02/24  1629 02/02/24  1635    140   K 4.1 4.1    106   CO2 26 24   BUN 11 11   CREATININE 0.64 0.65   CALCIUM 9.1 8.9        Recent Labs   Lab 02/02/24  1635   PROT 6.8   ALBUMIN 4.5   BILITOT 0.3   AST 23   ALT 16        No results for input(s): "TROPONINI" in the last 168 hours.     No results found for: "BNP"    No results for input(s): "LABBLOO" in the last 168 hours.         Assessment & Plan   63 year old female with PMH of SVT who presents to [facility] for SVT ablation with Dr. Romero.     Supraventricular tachycardia:   Risks/benefits/alternatives discussed with patient and she agrees to proceed. Consents signed.   -To EP lab for EPS and SVT ablation with Dr. Heather Newton MD  [facility]   Cardiovascular Disease PGY-VI     "

## 2024-02-09 NOTE — TRANSFER OF CARE
Anesthesia Transfer of Care Note    Patient: Puma Oreilly    Procedure(s) Performed: Procedure(s) (LRB):  Ablation, SVT, Accessory Pathway (N/A)    Patient location: PACU    Anesthesia Type: general    Transport from OR: Transported from OR on 6-10 L/min O2 by face mask with adequate spontaneous ventilation    Post pain: adequate analgesia    Post assessment: no apparent anesthetic complications and tolerated procedure well    Post vital signs: stable    Level of consciousness: sedated    Nausea/Vomiting: no nausea/vomiting    Complications: none    Transfer of care protocol was followed      Last vitals: Visit Vitals  Breastfeeding No

## 2024-02-09 NOTE — PROGRESS NOTES
Nursing Transfer Note        Reason patient is being transferred: back to short stay post recovery    Transfer To: short stay 6    Transfer via stretcher    Transfer with cardiac monitoring    Transported by RN    Telemetry: Box Number [id number], HR 80, NSR    Medicines sent: none    Any special needs or follow-up needed: continue bedrest w/ lay flat and frequent checks    Patient belongings transferred with patient: No    Chart send with patient: Yes    Notified: spouse notified on arrival to room    Patient reassessed at: to be done by receiving RN (date, time)

## 2024-02-09 NOTE — DISCHARGE SUMMARY
[facility] - Cardiology  Cardiac Electrophysiology  Discharge Summary        Patient Name: Puma Oreilly   MRN: [id number]   Admission Date: 2/9/2024    Hospital Length of Stay: 0   Discharge Date: 02/09/2024    Attending Physician: Luis Romero MD   Discharging Provider: Joshua Newton MD      HPI:   Ms Puma Oreilly is a 63 year old female with PMH of SVT who presents to [facility] for SVT ablation with Dr. Romero. She recently wore Zio patch which demonstrated frequent runs of SVT with variable AV block. She noted some occasional palpitations. She was offered EPS and ablation by Dr. Romero and agreed to proceed. She presents today for the aforementioned procedure.         Presenting EKG: NSR  CHADS-VASc: N/A  Anticoagulants: None   Antiarrhythmics: Propafenone 225 BID   LV EF: 60% (TTE 6/2022)  Pertinent labs: Hgb 11.9, INR 1.1, Cr 0.65    Hospital Course:   Ms Oreilly underwent uncomplicated ablation for treatment of atrial tachycardia. She tolerated the procedure well with no immediate complications. She completed 4 hours of bed rest and ambulated without evidence of bleeding or hematoma. She was discharged home in stable condition. Plan will be to discontinue propafenone, continue beta blocker, Zio patch in 2 months and follow up with Dr. Romero in 3 months.       Follow up:   Follow up with Dr. Romero in 3 months     Disposition:   Home or Self Care         Medication List        START taking these medications      aspirin 81 MG EC tablet  Commonly known as: ECOTRIN  Take 1 tablet (81 mg total) by mouth once daily.     metoprolol succinate 25 MG 24 hr tablet  Commonly known as: TOPROL-XL  Take 1 tablet (25 mg total) by mouth once daily.            CONTINUE taking these medications      azelastine 137 mcg (0.1 %) nasal spray  Commonly known as: ASTELIN     benzonatate 100 MG capsule  Commonly known as: TESSALON     diclofenac sodium 1 % Gel  Commonly known as: VOLTAREN     magnesium oxide 400 mg (241.3 mg magnesium)  tablet  Commonly known as: MAG-OX  Take 1 tablet (400 mg total) by mouth once daily.     multivitamin with minerals tablet            STOP taking these medications      metoprolol tartrate 25 MG tablet  Commonly known as: LOPRESSOR     propafenone 225 MG Cp12  Commonly known as: RYTHMOL SR               Where to Get Your Medications        These medications were sent to [facility] [place]  [address] [address]      Hours: Mon-Fri 7a-7p, Sat-Sun 10a-4p Phone: [phone]   aspirin 81 MG EC tablet  metoprolol succinate 25 MG 24 hr tablet           Joshua Newton MD  [facility]  Cardiovascular Disease, PGY-VI

## 2024-02-10 NOTE — NURSING
Pt brought to room 6 on SSCU post recovery in EP PACU. EP recovery RN, Shreya at bedside for handoff. Pt is AAOx4 and free from s/s of intolerable pain and distress. Pt's vs taken and wnl. Pt's manuela groin sites are free from s/s of bleeding. Gauze and tegaderm dressings are intact. Safety measures in place. Family called to the bedside.

## 2024-02-10 NOTE — NURSING
Pt drank, ate, walked, and used restroom without issues. Pt and pt's  given d/c paperwork. All questions and concerns addressed. Pt verbalized understanding of d/c info. Pt's iv and  tele removed. Pt's maunela groin sites are free from s/s of bleeding. Pt's manuela groin site dressings gauze/tegaderm are intact. Pt leaving in wheelchair to meet  out front..

## 2024-02-12 ENCOUNTER — TELEPHONE (OUTPATIENT)
Dept: CARDIOLOGY | Facility: CLINIC | Age: 64
End: 2024-02-12
Payer: COMMERCIAL

## 2024-02-12 ENCOUNTER — TELEPHONE (OUTPATIENT)
Dept: ELECTROPHYSIOLOGY | Facility: CLINIC | Age: 64
End: 2024-02-12
Payer: COMMERCIAL

## 2024-02-12 NOTE — TELEPHONE ENCOUNTER
Message left on vmail to return call regarding recent procedure. Daisy mcelroy, RN    ----- Message from Citlali Rider MA sent at 2/12/2024  2:27 PM CST -----  Patients wants to know if you can give her a call to discuss the procedure she just had. She has a few questions.  ----- Message -----  From: Citlali Rider MA  Sent: 2/9/2024   4:40 PM CST  To: Citlali Rider MA      ----- Message -----  From: Joshua Newton MD  Sent: 2/9/2024   3:26 PM CST  To: Heather Smith Staff    Hi,     I'm writing regarding this patient Mrs Oreilly that we just ablated for atrial tachycardia. Dr. Romero wanted her to wear a Zio patch in 2 months and have her follow up with him in 3 months.       Thanks,     Joshua Newton         
declines

## 2024-02-12 NOTE — ANESTHESIA POSTPROCEDURE EVALUATION
Anesthesia Post Evaluation    Patient: Puma Oreilly    Procedure(s) Performed: Procedure(s) (LRB):  Ablation, SVT, Accessory Pathway (N/A)    Final Anesthesia Type: general      Level of consciousness: awake and alert  Post-procedure vital signs: reviewed and stable  Pain control: Pain has been treated.  Airway patency: patent    PONV status: Absent or treated.  Anesthetic complications: no      Cardiovascular status: hemodynamically stable  Respiratory status: unassisted  Hydration status: euvolemic                Vitals Value Taken Time   /75 02/09/24 1930   Temp 36.7 °C (98 °F) 02/09/24 1930   Pulse 89 02/09/24 1930   Resp 18 02/09/24 1930   SpO2 98 % 02/09/24 1930         No case tracking events are documented in the log.      Pain/Bonnie Score: No data recorded

## 2024-02-12 NOTE — TELEPHONE ENCOUNTER
----- Message from Citlali Rider MA sent at 2/12/2024  2:26 PM CST -----  I scheduled her appointment w/ Heather in May at the [place] Location. Patient just need the Zio scheduled.  ----- Message -----  From: Citlali Rider MA  Sent: 2/9/2024   4:40 PM CST  To: Citlali Rider MA      ----- Message -----  From: Joshua Newton MD  Sent: 2/9/2024   3:26 PM CST  To: Heather Smith Staff    Hi,     I'm writing regarding this patient Mrs Oreilly that we just ablated for atrial tachycardia. Dr. Romero wanted her to wear a Zio patch in 2 months and have her follow up with him in 3 months.       Thanks,     Joshua Newton

## 2024-02-14 ENCOUNTER — TELEPHONE (OUTPATIENT)
Dept: CARDIOLOGY | Facility: CLINIC | Age: 64
End: 2024-02-14
Payer: COMMERCIAL

## 2024-02-14 DIAGNOSIS — I47.10 SVT (SUPRAVENTRICULAR TACHYCARDIA): Primary | ICD-10-CM

## 2024-02-14 NOTE — TELEPHONE ENCOUNTER
----- Message from Christie Tao sent at 2/14/2024  3:11 PM CST -----  Contact: Pt [phone]  Type:  Patient Returning Call    Who Called:  Pt   Who Left Message for Patient:  Mary   Does the patient know what this is regarding?:  yes   Best Call Back Number:  [phone]    Additional Information:  Pt requesting to change appt to 7am or after 4pm pls call back and advise

## 2024-02-14 NOTE — TELEPHONE ENCOUNTER
----- Message from Britt Steele RN sent at 2/14/2024 11:01 AM CST -----  Good morning! Pt had an SVT RFA with Dr. Romero on 2/9. He wants her to come back in 2 months for a 2 week monitor to be placed and follow up in the clinic in 3 months (already scheduled for 5/29). Can you assist with getting her scheduled for the monitor? Order is in.

## 2024-02-16 ENCOUNTER — TELEPHONE (OUTPATIENT)
Dept: FAMILY MEDICINE | Facility: CLINIC | Age: 64
End: 2024-02-16
Payer: COMMERCIAL

## 2024-02-16 NOTE — TELEPHONE ENCOUNTER
----- Message from Pratibha Cruz sent at 2/16/2024 11:40 AM CST -----  Pt had her blood work done 2 weeks ago and would like the results   [phone]

## 2024-04-18 ENCOUNTER — HOSPITAL ENCOUNTER (OUTPATIENT)
Dept: CARDIOLOGY | Facility: CLINIC | Age: 64
Discharge: HOME | End: 2024-04-18
Attending: INTERNAL MEDICINE
Payer: COMMERCIAL

## 2024-04-18 DIAGNOSIS — I47.10 SVT (SUPRAVENTRICULAR TACHYCARDIA): ICD-10-CM

## 2024-04-18 PROCEDURE — 93248 EXT ECG>7D<15D REV&INTERPJ: CPT | Mod: ,,, | Performed by: INTERNAL MEDICINE

## 2024-04-18 PROCEDURE — 93246 EXT ECG>7D<15D RECORDING: CPT | Mod: ,,, | Performed by: INTERNAL MEDICINE

## 2024-05-06 DIAGNOSIS — I47.10 SVT (SUPRAVENTRICULAR TACHYCARDIA): Primary | ICD-10-CM

## 2024-05-06 DIAGNOSIS — I47.10 SVT (SUPRAVENTRICULAR TACHYCARDIA): ICD-10-CM

## 2024-05-06 RX ORDER — METOPROLOL SUCCINATE 25 MG/1
25 TABLET, EXTENDED RELEASE ORAL DAILY
Qty: 90 TABLET | Refills: 3 | Status: SHIPPED | OUTPATIENT
Start: 2024-05-06 | End: 2024-05-06 | Stop reason: SDUPTHER

## 2024-05-06 RX ORDER — METOPROLOL SUCCINATE 25 MG/1
25 TABLET, EXTENDED RELEASE ORAL DAILY
Qty: 90 TABLET | Refills: 0 | Status: SHIPPED | OUTPATIENT
Start: 2024-05-06 | End: 2024-06-12 | Stop reason: SDUPTHER

## 2024-05-09 LAB
OHS CV EVENT MONITOR DAY: 12
OHS CV HOLTER HOOKUP DATE: NORMAL
OHS CV HOLTER HOOKUP TIME: NORMAL
OHS CV HOLTER LENGTH DECIMAL HOURS: 298
OHS CV HOLTER LENGTH HOURS: 10
OHS CV HOLTER LENGTH MINUTES: 0
OHS CV HOLTER SCAN DATE: NORMAL
OHS CV HOLTER SINUS AVERAGE HR: 76 BPM
OHS CV HOLTER SINUS MAX HR: 154 BPM
OHS CV HOLTER SINUS MIN HR: 55 BPM
OHS CV HOLTER STUDY END DATE: NORMAL
OHS CV HOLTER STUDY END TIME: NORMAL

## 2024-06-10 PROBLEM — I48.0 PAROXYSMAL A-FIB: Status: ACTIVE | Noted: 2024-06-10

## 2024-06-10 NOTE — PROGRESS NOTES
"Subjective:     HPI    I had the pleasure of seeing Puma Oreilyl in follow-up for her history of SVT. She is a 64F who was noted to have an abnormal heart rhythm following laparoscopic hernia repair in 7/2023 (strips not available for review). Pt had no cardiac sx during this time. This prompted a 7 day Zio monitor (reviewed by me today) showing sinus rhythm average 76 bpm (range  bpm), PAC burden 4%, and 2058 runs of SVT, longest 94 minutes with average rate 154 bpm. Given variable AV block, SVT is most consistent with AT  ms. Pt had no symptoms while wearing monitor.    Pt admits to occasional palpitations lasting at most 5 minutes. She has a home HR monitor which "jumps around".    Echo in 6/2022 showed EF 60%. Exercise SPECT in 6/2022 negative for ischemia.     Notably, pt has a history of PAF (12/12/2018--seen on ECG preop for spine surgery). No records available for review.    At initial visit in 9/2024 rythmol was started. On 2/9/2024 an EPS was performed. Four distinct atrial tachycardias were seen, and ablated. Rythmol stopped post-procedure.    Pt states that she no longer has sustained palpitations. Still occasionally gasps at night.    A 14 day Zio done in 4/2024 showed sinus rhythm average HR 76 bpm, rare PACs and PVCs, 295 runs of nonsustained AT, longest 21.1 seconds at 111 bpm.    My interpretation of today's ECG is sinus rhythm at 63 bpm.    Review of Systems   Constitutional: Negative for decreased appetite, malaise/fatigue, weight gain and weight loss.   HENT:  Negative for sore throat.    Eyes:  Negative for blurred vision.   Cardiovascular:  Positive for palpitations. Negative for chest pain, dyspnea on exertion, irregular heartbeat, leg swelling, near-syncope, orthopnea, paroxysmal nocturnal dyspnea and syncope.   Respiratory:  Negative for shortness of breath.    Skin:  Negative for rash.   Musculoskeletal:  Negative for arthritis.   Gastrointestinal:  Negative for abdominal " pain.   Neurological:  Negative for focal weakness.   Psychiatric/Behavioral:  Negative for altered mental status.      Objective:   Physical Exam  Vitals and nursing note reviewed.   Constitutional:       General: She is not in acute distress.     Appearance: She is well-developed.   HENT:      Head: Normocephalic and atraumatic.   Eyes:      General: No scleral icterus.     Conjunctiva/sclera: Conjunctivae normal.      Pupils: Pupils are equal, round, and reactive to light.   Neck:      Thyroid: No thyromegaly.      Vascular: No JVD.   Cardiovascular:      Rate and Rhythm: Normal rate and regular rhythm.      Pulses: Intact distal pulses.      Heart sounds: Normal heart sounds. No murmur heard.     No friction rub. No gallop.   Pulmonary:      Effort: Pulmonary effort is normal. No respiratory distress.      Breath sounds: Normal breath sounds.   Abdominal:      General: Bowel sounds are normal. There is no distension.      Palpations: Abdomen is soft.   Musculoskeletal:      Cervical back: Normal range of motion and neck supple.   Skin:     General: Skin is warm and dry.   Neurological:      Mental Status: She is alert and oriented to person, place, and time.   Psychiatric:         Behavior: Behavior normal.         Assessment:      1. Paroxysmal A-fib    2. SVT (supraventricular tachycardia)        Plan:     In summary, Puma Oreilly  is a 64F with a history of symptomatic, sustained AT. Pt now status-post AT ablation, with no sustained arrhythmias seen on recent 2 week Zio. PT remains on toprol xl 25 mg daily.    Plan is to hold the course, follow-up in 2 years.    Thank you for allowing me to participate in the care of this patient. Please do not hesitate to call me with any questions or concerns.

## 2024-06-12 ENCOUNTER — OFFICE VISIT (OUTPATIENT)
Dept: CARDIOLOGY | Facility: CLINIC | Age: 64
End: 2024-06-12
Payer: COMMERCIAL

## 2024-06-12 ENCOUNTER — TELEPHONE (OUTPATIENT)
Dept: CARDIOLOGY | Facility: CLINIC | Age: 64
End: 2024-06-12
Payer: COMMERCIAL

## 2024-06-12 VITALS
HEART RATE: 65 BPM | WEIGHT: 140 LBS | DIASTOLIC BLOOD PRESSURE: 74 MMHG | BODY MASS INDEX: 29.39 KG/M2 | RESPIRATION RATE: 16 BRPM | OXYGEN SATURATION: 99 % | HEIGHT: 58 IN | SYSTOLIC BLOOD PRESSURE: 134 MMHG

## 2024-06-12 DIAGNOSIS — I47.10 SVT (SUPRAVENTRICULAR TACHYCARDIA): ICD-10-CM

## 2024-06-12 DIAGNOSIS — I48.0 PAROXYSMAL A-FIB: Primary | ICD-10-CM

## 2024-06-12 PROCEDURE — 1126F AMNT PAIN NOTED NONE PRSNT: CPT | Mod: CPTII,S$GLB,, | Performed by: INTERNAL MEDICINE

## 2024-06-12 PROCEDURE — 3288F FALL RISK ASSESSMENT DOCD: CPT | Mod: CPTII,S$GLB,, | Performed by: INTERNAL MEDICINE

## 2024-06-12 PROCEDURE — 99214 OFFICE O/P EST MOD 30 MIN: CPT | Mod: S$GLB,,, | Performed by: INTERNAL MEDICINE

## 2024-06-12 PROCEDURE — 3008F BODY MASS INDEX DOCD: CPT | Mod: CPTII,S$GLB,, | Performed by: INTERNAL MEDICINE

## 2024-06-12 PROCEDURE — 3078F DIAST BP <80 MM HG: CPT | Mod: CPTII,S$GLB,, | Performed by: INTERNAL MEDICINE

## 2024-06-12 PROCEDURE — 1101F PT FALLS ASSESS-DOCD LE1/YR: CPT | Mod: CPTII,S$GLB,, | Performed by: INTERNAL MEDICINE

## 2024-06-12 PROCEDURE — 3075F SYST BP GE 130 - 139MM HG: CPT | Mod: CPTII,S$GLB,, | Performed by: INTERNAL MEDICINE

## 2024-06-12 PROCEDURE — 99999 PR PBB SHADOW E&M-EST. PATIENT-LVL III: CPT | Mod: PBBFAC,,, | Performed by: INTERNAL MEDICINE

## 2024-06-12 PROCEDURE — 1159F MED LIST DOCD IN RCRD: CPT | Mod: CPTII,S$GLB,, | Performed by: INTERNAL MEDICINE

## 2024-06-12 RX ORDER — METOPROLOL SUCCINATE 25 MG/1
25 TABLET, EXTENDED RELEASE ORAL DAILY
Qty: 90 TABLET | Refills: 3 | Status: SHIPPED | OUTPATIENT
Start: 2024-06-12 | End: 2025-06-12

## 2024-06-12 NOTE — TELEPHONE ENCOUNTER
----- Message from Ijeoma Celis, Patient Care Assistant sent at 6/12/2024  9:51 AM CDT -----  Regarding: advice  Contact: pt  Type: Needs Medical Advice      Who Called:  pt     Symptoms (please be specific):  breathing concerns   How long has patient had these symptoms:  1 day     Best Call Back Number: [phone] (home)     Additional Information: please call to advise. Thanks!

## 2024-06-25 ENCOUNTER — TELEPHONE (OUTPATIENT)
Dept: CARDIOLOGY | Facility: CLINIC | Age: 64
End: 2024-06-25
Payer: COMMERCIAL

## 2024-06-25 DIAGNOSIS — I47.10 SVT (SUPRAVENTRICULAR TACHYCARDIA): ICD-10-CM

## 2024-06-25 RX ORDER — METOPROLOL SUCCINATE 25 MG/1
25 TABLET, EXTENDED RELEASE ORAL DAILY
Qty: 90 TABLET | Refills: 3 | Status: SHIPPED | OUTPATIENT
Start: 2024-06-25 | End: 2025-06-25

## 2024-06-25 NOTE — TELEPHONE ENCOUNTER
06/25/24    Patient called me this am and stated that her Metoprolol needed a refill.  Manuel saw where Dr Romero had sent it in, but to the wrong pharmacy.  Belkiseffie will resend it in to the [facility]-[id number].    She also stated that she continues to have symptoms and wanted to know if there were any more tests that Dr SILVINO Bernardo wanted to order.  She stated that she has met all deductibles.

## 2024-07-01 ENCOUNTER — TELEPHONE (OUTPATIENT)
Dept: CARDIOLOGY | Facility: CLINIC | Age: 64
End: 2024-07-01
Payer: COMMERCIAL

## 2024-07-01 NOTE — TELEPHONE ENCOUNTER
----- Message from Fidencio Royal sent at 7/1/2024  9:11 AM CDT -----  Regarding: return call  Contact: patient  Type:  Patient Returning Call    Who Called:patient  Who Left Message for Patient:Kadie/Manuel  Does the patient know what this is regarding?:MD Romero needs patient to see MD Bernardo  Would the patient rather a call back or a response via [facility]? Please call to advise  Best Call Back Number:[phone]  Additional Information:

## 2024-07-01 NOTE — TELEPHONE ENCOUNTER
Pt wants to know if dr guzmán wants to order any more tests.  Pt hasn't been in a year. Wants an appt with dr guzmán

## 2024-07-15 ENCOUNTER — TELEPHONE (OUTPATIENT)
Dept: CARDIOLOGY | Facility: CLINIC | Age: 64
End: 2024-07-15
Payer: COMMERCIAL

## 2024-07-15 NOTE — TELEPHONE ENCOUNTER
----- Message from Berta Anderson sent at 7/15/2024  8:42 AM CDT -----  Contact: pt  Type:  Needs Medical Advice    MULTIPLE CALLS ABOUT THIS ISSUE    Who Called: pt    Would the patient rather a call back or a response via [facility]? Call back    Best Call Back Number: [phone]    Additional Information: is requesting a call back about needing more tests    Also, can not do the 8/12 appt    Please call to advise  Thanks

## 2024-07-16 NOTE — TELEPHONE ENCOUNTER
----- Message from Dara Grimm, Patient Care Assistant sent at 7/16/2024  2:11 PM CDT -----  Type: Needs Medical Advice  Who Called:  Lucy Bustos Call Back Number: [phone]    Additional Information:  would like top speak to nurse about having more test done , please call to further discuss Thank you

## 2024-07-17 ENCOUNTER — TELEPHONE (OUTPATIENT)
Dept: CARDIOLOGY | Facility: CLINIC | Age: 64
End: 2024-07-17
Payer: COMMERCIAL

## 2024-07-17 NOTE — TELEPHONE ENCOUNTER
----- Message from Shahbaz Christian sent at 7/17/2024 10:21 AM CDT -----  Name of Who is Calling:DESIREE COPELAND [[id number]]        What is the request in detail:Pt would like to speak to nurse about having more test done and to schedule appt with Dr Bernardo pt does not want to see NP.Please advise thank you       Can the clinic reply by [facility]:no        What Number to Call Back if not in [facility]:.Telephone Information:  Mobile          [phone]

## 2024-07-22 ENCOUNTER — TELEPHONE (OUTPATIENT)
Dept: FAMILY MEDICINE | Facility: CLINIC | Age: 64
End: 2024-07-22
Payer: COMMERCIAL

## 2024-07-22 DIAGNOSIS — N64.4 BREAST PAIN: ICD-10-CM

## 2024-07-22 DIAGNOSIS — Z98.82 H/O BILATERAL BREAST IMPLANTS: Primary | ICD-10-CM

## 2024-07-22 NOTE — TELEPHONE ENCOUNTER
----- Message from Pratibha Cruz sent at 7/22/2024  1:57 PM CDT -----  Vm- 1:20-pt needs a diag. Mammogram and u/s per dr. Snider as she has not seen them recently so they will not order it. She has implants and they do not feel right sometimes   [facility] imaging   [phone]

## 2024-07-30 ENCOUNTER — TELEPHONE (OUTPATIENT)
Dept: CARDIOLOGY | Facility: CLINIC | Age: 64
End: 2024-07-30
Payer: COMMERCIAL

## 2024-07-30 NOTE — TELEPHONE ENCOUNTER
LVM, SCHEDULING LU AT 4 ON MONDAY WITH ONE OF OUR NPS. ASKED IF SHE IS NOT ABLE TO KEEP LU PLEASE CALL BACK. DR. GRESHAM IS STILL BOOKED FOR THE NEXT SEVERAL WEEKS.

## 2024-07-30 NOTE — TELEPHONE ENCOUNTER
----- Message from Ana Ellen sent at 7/30/2024 12:37 PM CDT -----  Contact: Self  Type: Needs Medical Advice  Who Called:  Patient   Symptoms (please be specific):  gasping for breath, and additional heart issues  How long has patient had these symptoms:  last few weeks  Pharmacy name and phone #:  N/A  Best Call Back Number:  [phone]  Additional Information: Pt is still having these issues and stated she has been trying to get something done since 06/12. She feels like since both Dr Romero and Dr Bernardo does not know what is causing this she is asking if we could possibly order additional diagnostic testing to find out what the cause may be. She doesn't feel she should come back in until these are done so there is something for Dr Bernardo may look at and discuss with her. Is there anyway we can reach out to Dr Bernardo regarding ordering additional tests and call pt back to advise. Thank You

## 2024-08-05 ENCOUNTER — HOSPITAL ENCOUNTER (OUTPATIENT)
Dept: RADIOLOGY | Facility: HOSPITAL | Age: 64
Discharge: HOME | End: 2024-08-05
Attending: PHYSICIAN ASSISTANT
Payer: COMMERCIAL

## 2024-08-05 ENCOUNTER — TELEPHONE (OUTPATIENT)
Dept: CARDIOLOGY | Facility: CLINIC | Age: 64
End: 2024-08-05
Payer: COMMERCIAL

## 2024-08-05 DIAGNOSIS — Z98.82 H/O BILATERAL BREAST IMPLANTS: ICD-10-CM

## 2024-08-05 DIAGNOSIS — N64.4 BREAST PAIN: ICD-10-CM

## 2024-08-05 PROCEDURE — 77066 DX MAMMO INCL CAD BI: CPT | Mod: 26,,, | Performed by: RADIOLOGY

## 2024-08-05 PROCEDURE — 76642 ULTRASOUND BREAST LIMITED: CPT | Mod: TC,PO,RT

## 2024-08-05 PROCEDURE — 77066 DX MAMMO INCL CAD BI: CPT | Mod: TC,PO

## 2024-08-05 PROCEDURE — 76642 ULTRASOUND BREAST LIMITED: CPT | Mod: 26,RT,, | Performed by: RADIOLOGY

## 2024-08-05 PROCEDURE — 77062 BREAST TOMOSYNTHESIS BI: CPT | Mod: 26,,, | Performed by: RADIOLOGY

## 2024-08-05 PROCEDURE — 77062 BREAST TOMOSYNTHESIS BI: CPT | Mod: TC,PO

## 2024-08-06 ENCOUNTER — HOSPITAL ENCOUNTER (OUTPATIENT)
Dept: RADIOLOGY | Facility: HOSPITAL | Age: 64
Discharge: HOME | End: 2024-08-06
Attending: INTERNAL MEDICINE
Payer: COMMERCIAL

## 2024-08-06 ENCOUNTER — OFFICE VISIT (OUTPATIENT)
Dept: CARDIOLOGY | Facility: CLINIC | Age: 64
End: 2024-08-06
Payer: COMMERCIAL

## 2024-08-06 VITALS
HEART RATE: 69 BPM | BODY MASS INDEX: 29.26 KG/M2 | SYSTOLIC BLOOD PRESSURE: 118 MMHG | HEIGHT: 58 IN | DIASTOLIC BLOOD PRESSURE: 68 MMHG | RESPIRATION RATE: 16 BRPM | OXYGEN SATURATION: 99 %

## 2024-08-06 DIAGNOSIS — R07.89 OTHER CHEST PAIN: ICD-10-CM

## 2024-08-06 DIAGNOSIS — I47.10 SVT (SUPRAVENTRICULAR TACHYCARDIA): ICD-10-CM

## 2024-08-06 DIAGNOSIS — K52.9 CHRONIC DIARRHEA: ICD-10-CM

## 2024-08-06 DIAGNOSIS — R06.02 SHORTNESS OF BREATH AT REST: ICD-10-CM

## 2024-08-06 DIAGNOSIS — R07.89 OTHER CHEST PAIN: Primary | ICD-10-CM

## 2024-08-06 PROCEDURE — 1160F RVW MEDS BY RX/DR IN RCRD: CPT | Mod: CPTII,S$GLB,, | Performed by: INTERNAL MEDICINE

## 2024-08-06 PROCEDURE — 3074F SYST BP LT 130 MM HG: CPT | Mod: CPTII,S$GLB,, | Performed by: INTERNAL MEDICINE

## 2024-08-06 PROCEDURE — 3078F DIAST BP <80 MM HG: CPT | Mod: CPTII,S$GLB,, | Performed by: INTERNAL MEDICINE

## 2024-08-06 PROCEDURE — 1159F MED LIST DOCD IN RCRD: CPT | Mod: CPTII,S$GLB,, | Performed by: INTERNAL MEDICINE

## 2024-08-06 PROCEDURE — 71046 X-RAY EXAM CHEST 2 VIEWS: CPT | Mod: 26,,, | Performed by: RADIOLOGY

## 2024-08-06 PROCEDURE — 99214 OFFICE O/P EST MOD 30 MIN: CPT | Mod: S$GLB,,, | Performed by: INTERNAL MEDICINE

## 2024-08-06 PROCEDURE — 3288F FALL RISK ASSESSMENT DOCD: CPT | Mod: CPTII,S$GLB,, | Performed by: INTERNAL MEDICINE

## 2024-08-06 PROCEDURE — 1126F AMNT PAIN NOTED NONE PRSNT: CPT | Mod: CPTII,S$GLB,, | Performed by: INTERNAL MEDICINE

## 2024-08-06 PROCEDURE — 99999 PR PBB SHADOW E&M-EST. PATIENT-LVL III: CPT | Mod: PBBFAC,,, | Performed by: INTERNAL MEDICINE

## 2024-08-06 PROCEDURE — 3008F BODY MASS INDEX DOCD: CPT | Mod: CPTII,S$GLB,, | Performed by: INTERNAL MEDICINE

## 2024-08-06 PROCEDURE — 1101F PT FALLS ASSESS-DOCD LE1/YR: CPT | Mod: CPTII,S$GLB,, | Performed by: INTERNAL MEDICINE

## 2024-08-06 PROCEDURE — 71046 X-RAY EXAM CHEST 2 VIEWS: CPT | Mod: TC

## 2024-08-26 ENCOUNTER — TELEPHONE (OUTPATIENT)
Dept: CARDIOLOGY | Facility: HOSPITAL | Age: 64
End: 2024-08-26

## 2024-08-26 NOTE — TELEPHONE ENCOUNTER
Left message on voicemail.     Patient advised, test will be at [facility] ([address]   Will need to register on the first floor at the main entrance.   Patient advised that arrival time is 7:20am.  Patient advised that she may be here about 3.5-4 hours, and may want to bring something to occupy their time, as there will be periods of waiting.    Patient advised, may take her medications prior to testing if you need to.   Advised if she needs to eat to take her medications, please keep it light, like toast and juice.    Patient advised to avoid all caffeine 12 hours prior to testing.  This includes decaf tea and coffee.    Will provide peanut butter crackers for a snack after stress test.  If patient would prefer something else, please bring a snack from home.    Wear comfortable clothing.   No lotions, oils, or powders to the upper chest area. May wear deodorant.    No metal jewelry, buttons, or zippers to the upper body.  Advised to call the office if any questions.

## 2024-08-27 ENCOUNTER — HOSPITAL ENCOUNTER (OUTPATIENT)
Dept: RADIOLOGY | Facility: HOSPITAL | Age: 64
Discharge: HOME | End: 2024-08-27
Attending: INTERNAL MEDICINE
Payer: COMMERCIAL

## 2024-08-27 ENCOUNTER — HOSPITAL ENCOUNTER (OUTPATIENT)
Dept: CARDIOLOGY | Facility: HOSPITAL | Age: 64
Discharge: HOME | End: 2024-08-27
Attending: INTERNAL MEDICINE
Payer: COMMERCIAL

## 2024-08-27 VITALS — HEIGHT: 58 IN | BODY MASS INDEX: 29.39 KG/M2 | WEIGHT: 140 LBS

## 2024-08-27 DIAGNOSIS — R07.89 OTHER CHEST PAIN: ICD-10-CM

## 2024-08-27 DIAGNOSIS — I47.10 SVT (SUPRAVENTRICULAR TACHYCARDIA): ICD-10-CM

## 2024-08-27 DIAGNOSIS — M72.2 PLANTAR FASCIAL FIBROMATOSIS: Primary | ICD-10-CM

## 2024-08-27 LAB
AORTIC ROOT ANNULUS: 2.9 CM
AORTIC VALVE CUSP SEPERATION: 1.7 CM
AV INDEX (PROSTH): 0.92
AV MEAN GRADIENT: 3 MMHG
AV PEAK GRADIENT: 6 MMHG
AV REGURGITATION PRESSURE HALF TIME: 588 MS
AV VALVE AREA BY VELOCITY RATIO: 2.51 CM²
AV VALVE AREA: 2.6 CM²
AV VELOCITY RATIO: 0.89
BSA FOR ECHO PROCEDURE: 1.61 M2
CV ECHO LV RWT: 0.56 CM
CV PHARM DOSE: 0.4 MG
CV STRESS BASE HR: 59 BPM
DIASTOLIC BLOOD PRESSURE: 86 MMHG
DOP CALC AO PEAK VEL: 1.23 M/S
DOP CALC AO VTI: 27.7 CM
DOP CALC LVOT AREA: 2.8 CM2
DOP CALC LVOT DIAMETER: 1.9 CM
DOP CALC LVOT PEAK VEL: 1.09 M/S
DOP CALC LVOT STROKE VOLUME: 71.98 CM3
DOP CALC MV VTI: 32.1 CM
DOP CALCLVOT PEAK VEL VTI: 25.4 CM
E WAVE DECELERATION TIME: 185 MSEC
E/A RATIO: 1.3
E/E' RATIO: 13.14 M/S
ECHO LV POSTERIOR WALL: 1.1 CM (ref 0.6–1.1)
EJECTION FRACTION- HIGH: 65 %
END DIASTOLIC INDEX-HIGH: 153 ML/M2
END DIASTOLIC INDEX-LOW: 93 ML/M2
END SYSTOLIC INDEX-HIGH: 71 ML/M2
END SYSTOLIC INDEX-LOW: 31 ML/M2
FRACTIONAL SHORTENING: 36 % (ref 28–44)
INTERVENTRICULAR SEPTUM: 1.1 CM (ref 0.6–1.1)
IVC DIAMETER: 1.3 CM
IVRT: 77 MSEC
LEFT ATRIUM AREA SYSTOLIC (APICAL 2 CHAMBER): 19.4 CM2
LEFT ATRIUM AREA SYSTOLIC (APICAL 4 CHAMBER): 23.8 CM2
LEFT ATRIUM SIZE: 3.7 CM
LEFT ATRIUM VOLUME INDEX MOD: 41.8 ML/M2
LEFT ATRIUM VOLUME MOD: 65.6 CM3
LEFT INTERNAL DIMENSION IN SYSTOLE: 2.5 CM (ref 2.1–4)
LEFT VENTRICLE DIASTOLIC VOLUME INDEX: 41.98 ML/M2
LEFT VENTRICLE DIASTOLIC VOLUME: 65.91 ML
LEFT VENTRICLE END SYSTOLIC VOLUME APICAL 2 CHAMBER: 52 ML
LEFT VENTRICLE END SYSTOLIC VOLUME APICAL 4 CHAMBER: 80.3 ML
LEFT VENTRICLE MASS INDEX: 89 G/M2
LEFT VENTRICLE SYSTOLIC VOLUME INDEX: 14.2 ML/M2
LEFT VENTRICLE SYSTOLIC VOLUME: 22.32 ML
LEFT VENTRICULAR INTERNAL DIMENSION IN DIASTOLE: 3.9 CM (ref 3.5–6)
LEFT VENTRICULAR MASS: 140.09 G
LV LATERAL E/E' RATIO: 10.22 M/S
LV SEPTAL E/E' RATIO: 18.4 M/S
LVED V (TEICH): 65.91 ML
LVES V (TEICH): 22.32 ML
LVOT MG: 2 MMHG
LVOT MV: 0.71 CM/S
MV MEAN GRADIENT: 1 MMHG
MV PEAK A VEL: 0.71 M/S
MV PEAK E VEL: 0.92 M/S
MV PEAK GRADIENT: 3 MMHG
MV VALVE AREA BY CONTINUITY EQUATION: 2.24 CM2
NUC REST DIASTOLIC VOLUME INDEX: 45
NUC REST EJECTION FRACTION: 84
NUC REST SYSTOLIC VOLUME INDEX: 7
NUC STRESS DIASTOLIC VOLUME INDEX: 55
NUC STRESS EJECTION FRACTION: 85 %
NUC STRESS SYSTOLIC VOLUME INDEX: 8
OHS CV CPX 1 MINUTE RECOVERY HEART RATE: 90 BPM
OHS CV CPX 85 PERCENT MAX PREDICTED HEART RATE MALE: 131
OHS CV CPX MAX PREDICTED HEART RATE: 154
OHS CV CPX PATIENT IS FEMALE: 1
OHS CV CPX PATIENT IS MALE: 0
OHS CV CPX PEAK DIASTOLIC BLOOD PRESSURE: 84 MMHG
OHS CV CPX PEAK HEAR RATE: 90 BPM
OHS CV CPX PEAK RATE PRESSURE PRODUCT: NORMAL
OHS CV CPX PEAK SYSTOLIC BLOOD PRESSURE: 130 MMHG
OHS CV CPX PERCENT MAX PREDICTED HEART RATE ACHIEVED: 61
OHS CV CPX RATE PRESSURE PRODUCT PRESENTING: 8024
OHS CV RV/LV RATIO: 0.51 CM
PISA AR MAX VEL: 2.9 M/S
PISA TR MAX VEL: 2.25 M/S
PV MV: 0.48 M/S
PV PEAK GRADIENT: 2 MMHG
PV PEAK VELOCITY: 0.7 M/S
RA PRESSURE ESTIMATED: 3 MMHG
RETIRED EF AND QEF - SEE NOTES: 53 %
RIGHT VENTRICULAR END-DIASTOLIC DIMENSION: 2 CM
RV TB RVSP: 5 MMHG
RV TISSUE DOPPLER FREE WALL SYSTOLIC VELOCITY 1 (APICAL 4 CHAMBER VIEW): 12.7 CM/S
SYSTOLIC BLOOD PRESSURE: 136 MMHG
TDI LATERAL: 0.09 M/S
TDI SEPTAL: 0.05 M/S
TDI: 0.07 M/S
TR MAX PG: 20 MMHG
TRICUSPID ANNULAR PLANE SYSTOLIC EXCURSION: 1.91 CM
TV REST PULMONARY ARTERY PRESSURE: 23 MMHG
Z-SCORE OF LEFT VENTRICULAR DIMENSION IN END DIASTOLE: -1.46
Z-SCORE OF LEFT VENTRICULAR DIMENSION IN END SYSTOLE: -0.89

## 2024-08-27 PROCEDURE — 93016 CV STRESS TEST SUPVJ ONLY: CPT | Mod: ,,,

## 2024-08-27 PROCEDURE — 93306 TTE W/DOPPLER COMPLETE: CPT | Mod: 26,,, | Performed by: INTERNAL MEDICINE

## 2024-08-27 PROCEDURE — A9502 TC99M TETROFOSMIN: HCPCS | Performed by: INTERNAL MEDICINE

## 2024-08-27 PROCEDURE — 93306 TTE W/DOPPLER COMPLETE: CPT

## 2024-08-27 PROCEDURE — 63600175 PHARM REV CODE 636 W HCPCS: Performed by: INTERNAL MEDICINE

## 2024-08-27 PROCEDURE — 93018 CV STRESS TEST I&R ONLY: CPT | Mod: ,,, | Performed by: INTERNAL MEDICINE

## 2024-08-27 PROCEDURE — 78452 HT MUSCLE IMAGE SPECT MULT: CPT | Mod: 26,,, | Performed by: INTERNAL MEDICINE

## 2024-08-27 PROCEDURE — 78452 HT MUSCLE IMAGE SPECT MULT: CPT

## 2024-08-27 RX ORDER — REGADENOSON 0.08 MG/ML
0.4 INJECTION, SOLUTION INTRAVENOUS ONCE
Status: COMPLETED | OUTPATIENT
Start: 2024-08-27 | End: 2024-08-27

## 2024-08-27 RX ADMIN — REGADENOSON 0.4 MG: 0.08 INJECTION, SOLUTION INTRAVENOUS at 09:08

## 2024-08-27 RX ADMIN — TETROFOSMIN 27.5 MILLICURIE: 1.38 INJECTION, POWDER, LYOPHILIZED, FOR SOLUTION INTRAVENOUS at 09:08

## 2024-08-27 RX ADMIN — TETROFOSMIN 12 MILLICURIE: 1.38 INJECTION, POWDER, LYOPHILIZED, FOR SOLUTION INTRAVENOUS at 07:08

## 2024-08-29 ENCOUNTER — TELEPHONE (OUTPATIENT)
Dept: CARDIOLOGY | Facility: CLINIC | Age: 64
End: 2024-08-29
Payer: COMMERCIAL

## 2024-08-29 NOTE — TELEPHONE ENCOUNTER
----- Message from Fidencio Royal sent at 8/29/2024  2:28 PM CDT -----  Regarding: results  Contact: patient  Type:  Patient Returning Call    Who Called:patient  Who Left Message for Patient:Manuel HAWTHORNE  Does the patient know what this is regarding?:results  Would the patient rather a call back or a response via [facility]? Please call after 4  Best Call Back Number:[phone]  Additional Information:

## 2024-10-29 ENCOUNTER — TELEPHONE (OUTPATIENT)
Dept: CARDIOLOGY | Facility: CLINIC | Age: 64
End: 2024-10-29
Payer: COMMERCIAL

## 2024-10-31 ENCOUNTER — TELEPHONE (OUTPATIENT)
Dept: CARDIOLOGY | Facility: CLINIC | Age: 64
End: 2024-10-31
Payer: COMMERCIAL

## 2024-11-13 NOTE — H&P (VIEW-ONLY)
Subjective:       Patient ID: Puma Oreilly is a 64 y.o. female Body mass index is 27.17 kg/m².    Chief Complaint: Nausea (diarrhea)    This patient is new to me.     2023 Hernia repair - then had issues with heart rate and had ablations    Chronic nausea and diarrhea   Zofran doesn't work - states only phenergan works      Review of Systems   Constitutional:  Positive for appetite change. Negative for activity change, fatigue, fever and unexpected weight change.   HENT:  Negative for sore throat and trouble swallowing.    Respiratory:  Negative for cough and shortness of breath.    Cardiovascular:  Negative for chest pain.   Gastrointestinal:  Positive for abdominal pain, diarrhea and nausea. Negative for abdominal distention, anal bleeding, blood in stool, constipation, rectal pain and vomiting.       No LMP recorded. Patient has had a hysterectomy.  Past Medical History:   Diagnosis Date    Breast, fat necrosis     bilateral    GERD (gastroesophageal reflux disease)     PONV (postoperative nausea and vomiting)     Wears glasses      Past Surgical History:   Procedure Laterality Date    ABDOMINAL SURGERY      abdominalplasty    ABLATION, SVT, ACCESSORY PATHWAY N/A 2024    Procedure: Ablation, SVT, Accessory Pathway;  Surgeon: Luis Romero MD;  Location: [facility] EP LAB;  Service: Cardiology;  Laterality: N/A;  SVT, RFA, Carto, MAC, GP, 3 Prep    AUGMENTATION OF BREAST      breast augmentation      breast reduction      CARPAL TUNNEL RELEASE      right    carpel tunnel       SECTION, CLASSIC      esphogeal stricture      GASTRIC BYPASS      HYSTERECTOMY      PARTIAL NEPHRECTOMY      Rt Kidney    SHOULDER SURGERY      SMALL INTESTINE SURGERY      SPINAL FUSION  3/2015    ACDF  Dr Pichardo    THIGH LIFT      TONSILLECTOMY, ADENOIDECTOMY      TOTAL REDUCTION MAMMOPLASTY       Family History   Problem Relation Name Age of Onset    Diabetes Mother      Prostate cancer Father      Stroke  Maternal Aunt      Cancer Maternal Grandmother          stomach    Lung cancer Maternal Grandmother      Diabetes Maternal Grandfather      Cancer Paternal Grandmother          lung     Social History     Tobacco Use    Smoking status: Never    Smokeless tobacco: Never   Substance Use Topics    Alcohol use: No    Drug use: No     Wt Readings from Last 10 Encounters:   11/19/24 59 kg (130 lb)   08/27/24 63.5 kg (139 lb 15.9 oz)   06/12/24 63.5 kg (140 lb)   11/01/23 62.1 kg (137 lb)   10/03/23 62.1 kg (137 lb)   09/06/23 63.4 kg (139 lb 12.4 oz)   07/21/23 64.4 kg (142 lb)   06/01/22 61.2 kg (134 lb 14.7 oz)   03/28/22 61.2 kg (135 lb)   06/11/21 57.2 kg (126 lb 1.7 oz)     Lab Results   Component Value Date    WBC 5.99 08/06/2024    HGB 12.2 08/06/2024    HCT 38.2 08/06/2024    MCV 83 08/06/2024     08/06/2024     CMP  Sodium   Date Value Ref Range Status   08/06/2024 140 136 - 145 mmol/L Final     Potassium   Date Value Ref Range Status   08/06/2024 4.0 3.5 - 5.1 mmol/L Final     Chloride   Date Value Ref Range Status   08/06/2024 107 95 - 110 mmol/L Final     CO2   Date Value Ref Range Status   08/06/2024 26 23 - 29 mmol/L Final     Glucose   Date Value Ref Range Status   08/06/2024 101 70 - 110 mg/dL Final     BUN   Date Value Ref Range Status   08/06/2024 10 8 - 23 mg/dL Final     Creatinine   Date Value Ref Range Status   08/06/2024 0.7 0.5 - 1.4 mg/dL Final   06/17/2013 0.8 0.5 - 1.4 mg/dL Final   06/17/2013 0.8 0.5 - 1.4 mg/dL Final     Calcium   Date Value Ref Range Status   08/06/2024 9.1 8.7 - 10.5 mg/dL Final   06/17/2013 10.4 8.7 - 10.5 mg/dL Final   06/17/2013 10.3 8.7 - 10.5 mg/dL Final     Total Protein   Date Value Ref Range Status   08/06/2024 7.2 6.0 - 8.4 g/dL Final     Albumin   Date Value Ref Range Status   08/06/2024 4.4 3.5 - 5.2 g/dL Final     Total Bilirubin   Date Value Ref Range Status   08/06/2024 0.5 0.1 - 1.0 mg/dL Final     Comment:     For infants and newborns, interpretation  "of results should be based  on gestational age, weight and in agreement with clinical  observations.    Premature Infant recommended reference ranges:  Up to 24 hours.............<8.0 mg/dL  Up to 48 hours............<12.0 mg/dL  3-5 days..................<15.0 mg/dL  6-29 days.................<15.0 mg/dL       Alkaline Phosphatase   Date Value Ref Range Status   08/06/2024 101 55 - 135 U/L Final   06/17/2013 85 55 - 135 U/L Final     AST   Date Value Ref Range Status   08/06/2024 21 10 - 40 U/L Final   06/17/2013 25 10 - 40 U/L Final     ALT   Date Value Ref Range Status   08/06/2024 16 10 - 44 U/L Final     Anion Gap   Date Value Ref Range Status   08/06/2024 7 (L) 8 - 16 mmol/L Final   06/17/2013 11 5 - 15 meq/L Final   06/17/2013 12 5 - 15 meq/L Final     eGFR if    Date Value Ref Range Status   07/17/2018 >60 >60 mL/min/1.73 m^2 Final     eGFR if non    Date Value Ref Range Status   07/17/2018 >60 >60 mL/min/1.73 m^2 Final     Comment:     Calculation used to obtain the estimated glomerular filtration  rate (eGFR) is the CKD-EPI equation.        No results found for: "AMYLASE"  No results found for: "LIPASE"  No results found for: "LIPASERES"  Lab Results   Component Value Date    TSH 2.348 08/06/2024       Reviewed prior medical records including radiology report of no recent GI imaging & endoscopy history (see surgical history).    Objective:      Physical Exam  Vitals and nursing note reviewed.   Constitutional:       General: She is not in acute distress.     Appearance: She is not ill-appearing.   HENT:      Head: Normocephalic and atraumatic.      Mouth/Throat:      Mouth: Mucous membranes are moist.      Pharynx: Oropharynx is clear.   Eyes:      Conjunctiva/sclera: Conjunctivae normal.   Cardiovascular:      Rate and Rhythm: Normal rate and regular rhythm.      Pulses: Normal pulses.      Heart sounds: Normal heart sounds.   Pulmonary:      Effort: Pulmonary effort is " normal. No respiratory distress.   Abdominal:      General: Abdomen is flat. Bowel sounds are increased. There is no distension.      Palpations: Abdomen is soft.      Tenderness: There is abdominal tenderness.   Skin:     General: Skin is warm and dry.      Capillary Refill: Capillary refill takes 2 to 3 seconds.   Neurological:      Mental Status: She is alert and oriented to person, place, and time.   Psychiatric:         Mood and Affect: Mood is anxious.         Assessment:       1. Diarrhea, unspecified type    2. Nausea        Plan:       Diarrhea, unspecified type  Pt is s/p gastric bypass and cholecystectomy - suspect diarrhea is due to both of these procedures and possible bile acid diarrhea.    Will do stool studies to rule out bacterial and/or EPI causes and colonoscopy to rule out microscopic colitis    - schedule Colonoscopy, discussed procedure with the patient, including risks and benefits, patient verbalized understanding    -     H. pylori antigen, stool; Future; Expected date: 11/19/2024  -     Pancreatic elastase, fecal; Future; Expected date: 11/19/2024  -     Giardia / Cryptosporidum, EIA; Future; Expected date: 11/19/2024  -     Stool Exam-Ova,Cysts,Parasites; Future; Expected date: 11/19/2024  -     Clostridium difficile EIA; Future; Expected date: 11/19/2024  -     Stool culture; Future; Expected date: 11/19/2024  -     Case Request Endoscopy: EGD (ESOPHAGOGASTRODUODENOSCOPY), COLONOSCOPY    Nausea  -discussed about the different types of medications used to treat reflux and how to use them, antacids can be used PRN for breakthrough heartburn symptoms by reducing stomach acid that is already produced, H2 blockers work by limiting the amount acid production, & PPI's work to block acid production and are taken daily, patient verbalized understanding.  -Educated patient on lifestyle modifications to help control/reduce reflux/abdominal pain including: avoid large meals, avoid eating within 2-3  hours of bedtime (avoid late night eating & lying down soon after eating), elevate head of bed if nocturnal symptoms are present, smoking cessation (if current smoker), & weight loss (if overweight).   -Educated to avoid known foods which trigger reflux symptoms & to minimize/avoid high-fat foods, chocolate, caffeine, citrus, alcohol, & tomato products.  -Advised to avoid/limit use of NSAID's, since they can cause GI upset, bleeding, and/or ulcers. If needed, take with food.     Suspect nausea is related to reflux.  Start protonix 40 mg daily  - schedule EGD, discussed procedure with patient, including risks and benefits, patient verbalized understanding    -     pantoprazole (PROTONIX) 40 MG tablet; Take 1 tablet (40 mg total) by mouth once daily.  Dispense: 90 tablet; Refill: 3  -     Case Request Endoscopy: EGD (ESOPHAGOGASTRODUODENOSCOPY), COLONOSCOPY      Follow up in about 3 months (around 2/19/2025), or if symptoms worsen or fail to improve.      If no improvement in symptoms or symptoms worsen, call/follow-up at clinic or go to ER.       JAY Cole, JEREMYP-C    Encounter includes face to face time and non-face to face time preparing to see the patient (eg, review of tests), obtaining and/or reviewing separately obtained history, documenting clinical information in the electronic or other health record, independently interpreting results (not separately reported) and communicating results to the patient/family/caregiver, or care coordination (not separately reported).     A dictation software program was used for this note. Please expect some simple typographical errors in this note.

## 2024-11-13 NOTE — PROGRESS NOTES
Subjective:       Patient ID: Puma Oreilly is a 64 y.o. female Body mass index is 27.17 kg/m².    Chief Complaint: Nausea (diarrhea)    This patient is new to me.     2023 Hernia repair - then had issues with heart rate and had ablations    Chronic nausea and diarrhea   Zofran doesn't work - states only phenergan works      Review of Systems   Constitutional:  Positive for appetite change. Negative for activity change, fatigue, fever and unexpected weight change.   HENT:  Negative for sore throat and trouble swallowing.    Respiratory:  Negative for cough and shortness of breath.    Cardiovascular:  Negative for chest pain.   Gastrointestinal:  Positive for abdominal pain, diarrhea and nausea. Negative for abdominal distention, anal bleeding, blood in stool, constipation, rectal pain and vomiting.       No LMP recorded. Patient has had a hysterectomy.  Past Medical History:   Diagnosis Date    Breast, fat necrosis     bilateral    GERD (gastroesophageal reflux disease)     PONV (postoperative nausea and vomiting)     Wears glasses      Past Surgical History:   Procedure Laterality Date    ABDOMINAL SURGERY      abdominalplasty    ABLATION, SVT, ACCESSORY PATHWAY N/A 2024    Procedure: Ablation, SVT, Accessory Pathway;  Surgeon: Luis Romero MD;  Location: [facility] EP LAB;  Service: Cardiology;  Laterality: N/A;  SVT, RFA, Carto, MAC, GP, 3 Prep    AUGMENTATION OF BREAST      breast augmentation      breast reduction      CARPAL TUNNEL RELEASE      right    carpel tunnel       SECTION, CLASSIC      esphogeal stricture      GASTRIC BYPASS      HYSTERECTOMY      PARTIAL NEPHRECTOMY      Rt Kidney    SHOULDER SURGERY      SMALL INTESTINE SURGERY      SPINAL FUSION  3/2015    [facility]  Dr Pichardo    THIGH LIFT      TONSILLECTOMY, ADENOIDECTOMY      TOTAL REDUCTION MAMMOPLASTY       Family History   Problem Relation Name Age of Onset    Diabetes Mother      Prostate cancer Father      Stroke  Maternal Aunt      Cancer Maternal Grandmother          stomach    Lung cancer Maternal Grandmother      Diabetes Maternal Grandfather      Cancer Paternal Grandmother          lung     Social History     Tobacco Use    Smoking status: Never    Smokeless tobacco: Never   Substance Use Topics    Alcohol use: No    Drug use: No     Wt Readings from Last 10 Encounters:   11/19/24 59 kg (130 lb)   08/27/24 63.5 kg (139 lb 15.9 oz)   06/12/24 63.5 kg (140 lb)   11/01/23 62.1 kg (137 lb)   10/03/23 62.1 kg (137 lb)   09/06/23 63.4 kg (139 lb 12.4 oz)   07/21/23 64.4 kg (142 lb)   06/01/22 61.2 kg (134 lb 14.7 oz)   03/28/22 61.2 kg (135 lb)   06/11/21 57.2 kg (126 lb 1.7 oz)     Lab Results   Component Value Date    WBC 5.99 08/06/2024    HGB 12.2 08/06/2024    HCT 38.2 08/06/2024    MCV 83 08/06/2024     08/06/2024     CMP  Sodium   Date Value Ref Range Status   08/06/2024 140 136 - 145 mmol/L Final     Potassium   Date Value Ref Range Status   08/06/2024 4.0 3.5 - 5.1 mmol/L Final     Chloride   Date Value Ref Range Status   08/06/2024 107 95 - 110 mmol/L Final     CO2   Date Value Ref Range Status   08/06/2024 26 23 - 29 mmol/L Final     Glucose   Date Value Ref Range Status   08/06/2024 101 70 - 110 mg/dL Final     BUN   Date Value Ref Range Status   08/06/2024 10 8 - 23 mg/dL Final     Creatinine   Date Value Ref Range Status   08/06/2024 0.7 0.5 - 1.4 mg/dL Final   06/17/2013 0.8 0.5 - 1.4 mg/dL Final   06/17/2013 0.8 0.5 - 1.4 mg/dL Final     Calcium   Date Value Ref Range Status   08/06/2024 9.1 8.7 - 10.5 mg/dL Final   06/17/2013 10.4 8.7 - 10.5 mg/dL Final   06/17/2013 10.3 8.7 - 10.5 mg/dL Final     Total Protein   Date Value Ref Range Status   08/06/2024 7.2 6.0 - 8.4 g/dL Final     Albumin   Date Value Ref Range Status   08/06/2024 4.4 3.5 - 5.2 g/dL Final     Total Bilirubin   Date Value Ref Range Status   08/06/2024 0.5 0.1 - 1.0 mg/dL Final     Comment:     For infants and newborns, interpretation  "of results should be based  on gestational age, weight and in agreement with clinical  observations.    Premature Infant recommended reference ranges:  Up to 24 hours.............<8.0 mg/dL  Up to 48 hours............<12.0 mg/dL  3-5 days..................<15.0 mg/dL  6-29 days.................<15.0 mg/dL       Alkaline Phosphatase   Date Value Ref Range Status   08/06/2024 101 55 - 135 U/L Final   06/17/2013 85 55 - 135 U/L Final     AST   Date Value Ref Range Status   08/06/2024 21 10 - 40 U/L Final   06/17/2013 25 10 - 40 U/L Final     ALT   Date Value Ref Range Status   08/06/2024 16 10 - 44 U/L Final     Anion Gap   Date Value Ref Range Status   08/06/2024 7 (L) 8 - 16 mmol/L Final   06/17/2013 11 5 - 15 meq/L Final   06/17/2013 12 5 - 15 meq/L Final     eGFR if    Date Value Ref Range Status   07/17/2018 >60 >60 mL/min/1.73 m^2 Final     eGFR if non    Date Value Ref Range Status   07/17/2018 >60 >60 mL/min/1.73 m^2 Final     Comment:     Calculation used to obtain the estimated glomerular filtration  rate (eGFR) is the CKD-EPI equation.        No results found for: "AMYLASE"  No results found for: "LIPASE"  No results found for: "LIPASERES"  Lab Results   Component Value Date    TSH 2.348 08/06/2024       Reviewed prior medical records including radiology report of no recent GI imaging & endoscopy history (see surgical history).    Objective:      Physical Exam  Vitals and nursing note reviewed.   Constitutional:       General: She is not in acute distress.     Appearance: She is not ill-appearing.   HENT:      Head: Normocephalic and atraumatic.      Mouth/Throat:      Mouth: Mucous membranes are moist.      Pharynx: Oropharynx is clear.   Eyes:      Conjunctiva/sclera: Conjunctivae normal.   Cardiovascular:      Rate and Rhythm: Normal rate and regular rhythm.      Pulses: Normal pulses.      Heart sounds: Normal heart sounds.   Pulmonary:      Effort: Pulmonary effort is " normal. No respiratory distress.   Abdominal:      General: Abdomen is flat. Bowel sounds are increased. There is no distension.      Palpations: Abdomen is soft.      Tenderness: There is abdominal tenderness.   Skin:     General: Skin is warm and dry.      Capillary Refill: Capillary refill takes 2 to 3 seconds.   Neurological:      Mental Status: She is alert and oriented to person, place, and time.   Psychiatric:         Mood and Affect: Mood is anxious.         Assessment:       1. Diarrhea, unspecified type    2. Nausea        Plan:       Diarrhea, unspecified type  Pt is s/p gastric bypass and cholecystectomy - suspect diarrhea is due to both of these procedures and possible bile acid diarrhea.    Will do stool studies to rule out bacterial and/or EPI causes and colonoscopy to rule out microscopic colitis    - schedule Colonoscopy, discussed procedure with the patient, including risks and benefits, patient verbalized understanding    -     H. pylori antigen, stool; Future; Expected date: 11/19/2024  -     Pancreatic elastase, fecal; Future; Expected date: 11/19/2024  -     Giardia / Cryptosporidum, EIA; Future; Expected date: 11/19/2024  -     Stool Exam-Ova,Cysts,Parasites; Future; Expected date: 11/19/2024  -     Clostridium difficile EIA; Future; Expected date: 11/19/2024  -     Stool culture; Future; Expected date: 11/19/2024  -     Case Request Endoscopy: EGD (ESOPHAGOGASTRODUODENOSCOPY), COLONOSCOPY    Nausea  -discussed about the different types of medications used to treat reflux and how to use them, antacids can be used PRN for breakthrough heartburn symptoms by reducing stomach acid that is already produced, H2 blockers work by limiting the amount acid production, & PPI's work to block acid production and are taken daily, patient verbalized understanding.  -Educated patient on lifestyle modifications to help control/reduce reflux/abdominal pain including: avoid large meals, avoid eating within 2-3  hours of bedtime (avoid late night eating & lying down soon after eating), elevate head of bed if nocturnal symptoms are present, smoking cessation (if current smoker), & weight loss (if overweight).   -Educated to avoid known foods which trigger reflux symptoms & to minimize/avoid high-fat foods, chocolate, caffeine, citrus, alcohol, & tomato products.  -Advised to avoid/limit use of NSAID's, since they can cause GI upset, bleeding, and/or ulcers. If needed, take with food.     Suspect nausea is related to reflux.  Start protonix 40 mg daily  - schedule EGD, discussed procedure with patient, including risks and benefits, patient verbalized understanding    -     pantoprazole (PROTONIX) 40 MG tablet; Take 1 tablet (40 mg total) by mouth once daily.  Dispense: 90 tablet; Refill: 3  -     Case Request Endoscopy: EGD (ESOPHAGOGASTRODUODENOSCOPY), COLONOSCOPY      Follow up in about 3 months (around 2/19/2025), or if symptoms worsen or fail to improve.      If no improvement in symptoms or symptoms worsen, call/follow-up at clinic or go to ER.       JAY Cole, JEREMYP-C    Encounter includes face to face time and non-face to face time preparing to see the patient (eg, review of tests), obtaining and/or reviewing separately obtained history, documenting clinical information in the electronic or other health record, independently interpreting results (not separately reported) and communicating results to the patient/family/caregiver, or care coordination (not separately reported).     A dictation software program was used for this note. Please expect some simple typographical errors in this note.

## 2024-11-19 ENCOUNTER — OFFICE VISIT (OUTPATIENT)
Dept: GASTROENTEROLOGY | Facility: CLINIC | Age: 64
End: 2024-11-19
Payer: COMMERCIAL

## 2024-11-19 VITALS
DIASTOLIC BLOOD PRESSURE: 83 MMHG | SYSTOLIC BLOOD PRESSURE: 138 MMHG | HEART RATE: 62 BPM | WEIGHT: 130 LBS | HEIGHT: 58 IN | BODY MASS INDEX: 27.29 KG/M2

## 2024-11-19 DIAGNOSIS — R11.0 NAUSEA: ICD-10-CM

## 2024-11-19 DIAGNOSIS — R19.7 DIARRHEA, UNSPECIFIED TYPE: Primary | ICD-10-CM

## 2024-11-19 PROCEDURE — 3008F BODY MASS INDEX DOCD: CPT | Mod: CPTII,S$GLB,,

## 2024-11-19 PROCEDURE — 99999 PR PBB SHADOW E&M-EST. PATIENT-LVL III: CPT | Mod: PBBFAC,,,

## 2024-11-19 PROCEDURE — 99204 OFFICE O/P NEW MOD 45 MIN: CPT | Mod: S$GLB,,,

## 2024-11-19 PROCEDURE — 3288F FALL RISK ASSESSMENT DOCD: CPT | Mod: CPTII,S$GLB,,

## 2024-11-19 PROCEDURE — 1101F PT FALLS ASSESS-DOCD LE1/YR: CPT | Mod: CPTII,S$GLB,,

## 2024-11-19 PROCEDURE — 3079F DIAST BP 80-89 MM HG: CPT | Mod: CPTII,S$GLB,,

## 2024-11-19 PROCEDURE — 1126F AMNT PAIN NOTED NONE PRSNT: CPT | Mod: CPTII,S$GLB,,

## 2024-11-19 PROCEDURE — 3075F SYST BP GE 130 - 139MM HG: CPT | Mod: CPTII,S$GLB,,

## 2024-11-19 RX ORDER — PANTOPRAZOLE SODIUM 40 MG/1
40 TABLET, DELAYED RELEASE ORAL DAILY
Qty: 90 TABLET | Refills: 3 | Status: SHIPPED | OUTPATIENT
Start: 2024-11-19 | End: 2025-11-19

## 2024-11-19 RX ORDER — PANTOPRAZOLE SODIUM 40 MG/1
40 TABLET, DELAYED RELEASE ORAL DAILY
Qty: 90 TABLET | Refills: 3 | Status: SHIPPED | OUTPATIENT
Start: 2024-11-19 | End: 2024-11-19

## 2024-11-25 ENCOUNTER — TELEPHONE (OUTPATIENT)
Dept: GASTROENTEROLOGY | Facility: CLINIC | Age: 64
End: 2024-11-25
Payer: COMMERCIAL

## 2024-11-25 ENCOUNTER — LAB VISIT (OUTPATIENT)
Dept: LAB | Facility: HOSPITAL | Age: 64
End: 2024-11-25
Payer: COMMERCIAL

## 2024-11-25 DIAGNOSIS — R19.7 DIARRHEA, UNSPECIFIED TYPE: ICD-10-CM

## 2024-11-25 PROCEDURE — 87449 NOS EACH ORGANISM AG IA: CPT | Mod: 91

## 2024-11-25 PROCEDURE — 87338 HPYLORI STOOL AG IA: CPT

## 2024-11-25 PROCEDURE — 87177 OVA AND PARASITES SMEARS: CPT

## 2024-11-25 PROCEDURE — 87427 SHIGA-LIKE TOXIN AG IA: CPT | Mod: 59

## 2024-11-25 PROCEDURE — 87045 FECES CULTURE AEROBIC BACT: CPT

## 2024-11-25 PROCEDURE — 87046 STOOL CULTR AEROBIC BACT EA: CPT

## 2024-11-25 PROCEDURE — 82653 EL-1 FECAL QUANTITATIVE: CPT

## 2024-11-25 PROCEDURE — 87329 GIARDIA AG IA: CPT

## 2024-11-25 PROCEDURE — 87324 CLOSTRIDIUM AG IA: CPT

## 2024-11-25 NOTE — TELEPHONE ENCOUNTER
Left voicemail for patient. Stool studies turned in at 0737 on 11/25/24. Stool studies take at least 3-5 business days to result. Provider and/or provider's office staff will call patient if any abnormal results come through or when all results are back.

## 2024-11-26 LAB
C DIFF GDH STL QL: NEGATIVE
C DIFF TOX A+B STL QL IA: NEGATIVE
CRYPTOSP AG STL QL IA: NEGATIVE
E COLI SXT1 STL QL IA: NEGATIVE
E COLI SXT2 STL QL IA: NEGATIVE
G LAMBLIA AG STL QL IA: NEGATIVE

## 2024-11-29 LAB
BACTERIA STL CULT: NORMAL
H PYLORI AG STL QL IA: NOT DETECTED
O+P STL MICRO: NORMAL

## 2024-11-30 LAB — ELASTASE 1, FECAL: 71 MCG/G

## 2024-11-30 RX ORDER — PANCRELIPASE 36000; 180000; 114000 [USP'U]/1; [USP'U]/1; [USP'U]/1
CAPSULE, DELAYED RELEASE PELLETS ORAL
Qty: 112 CAPSULE | Refills: 11 | OUTPATIENT
Start: 2024-11-30

## 2024-11-30 RX ORDER — PANCRELIPASE 36000; 180000; 114000 [USP'U]/1; [USP'U]/1; [USP'U]/1
CAPSULE, DELAYED RELEASE PELLETS ORAL
Qty: 112 CAPSULE | Refills: 11 | Status: SHIPPED | OUTPATIENT
Start: 2024-12-02

## 2024-11-30 NOTE — TELEPHONE ENCOUNTER
Encounter to verify prescription benefits information and complete PA for Creon 36,000 units.    Drug not covered by patient's plan. Will have patient fill out patient assistance paperwork to receive medication from the .

## 2024-12-03 ENCOUNTER — TELEPHONE (OUTPATIENT)
Dept: GASTROENTEROLOGY | Facility: CLINIC | Age: 64
End: 2024-12-03
Payer: COMMERCIAL

## 2024-12-03 NOTE — TELEPHONE ENCOUNTER
----- Message from Zayra sent at 12/3/2024  2:10 PM CST -----  Contact: Patient  Type:  Test Results    Who Called:   Patient  Name of Test (Lab/Mammo/Etc):   Stool sample  Date of Test:    About a week  Ordering Provider:   Arleth Waldron  Where the test was performed:    [facility]    Would the patient rather a call back or a response via [facility]?   Call back  Best Call Back Number:   [phone]    Additional Information:    States she would like to speak with someone to get the results of her stool sample - please call - thank you

## 2024-12-03 NOTE — TELEPHONE ENCOUNTER
Call placed to Ms. Oreilly in regards to message received. I informed her per Arleth Conklin NP Please let the patient know - all of her stool studies are normal except her pancreatic elastase which is 71 (normal is over 200). This is called exocrine pancreatic insufficiency and requires treatment with creon which I will send to her pharmacy. She needs to take the creon with each meal and a snack. The dose is weight based and I will start at the low end of the dose range and we can increase if needed after 1 month of taking it on a regular basis.     There is patient assistance available if she cannot afford the cost of the medication. She verbalized understanding. No further issues noted.

## 2024-12-09 ENCOUNTER — TELEPHONE (OUTPATIENT)
Dept: GASTROENTEROLOGY | Facility: CLINIC | Age: 64
End: 2024-12-09
Payer: COMMERCIAL

## 2024-12-09 DIAGNOSIS — R13.10 DYSPHAGIA, UNSPECIFIED TYPE: ICD-10-CM

## 2024-12-09 DIAGNOSIS — K21.9 GASTROESOPHAGEAL REFLUX DISEASE, UNSPECIFIED WHETHER ESOPHAGITIS PRESENT: Primary | ICD-10-CM

## 2024-12-09 RX ORDER — OMEPRAZOLE 40 MG/1
40 CAPSULE, DELAYED RELEASE ORAL
Qty: 180 CAPSULE | Refills: 0 | Status: SHIPPED | OUTPATIENT
Start: 2024-12-09 | End: 2025-03-09

## 2024-12-09 NOTE — TELEPHONE ENCOUNTER
----- Message from [facility] sent at 12/9/2024 10:20 AM CST -----  Type:  Needs Medical Advice    Who Called: the patient  Symptoms (please be specific):trouble swallowing   How long has patient had these symptoms:    Pharmacy name and phone #:    [id number] - [place] - [address]  [address] [address]  Phone: [phone] Fax: [phone]  Would the patient rather a call back or a response via [facility]? call back/Telefonicaner  Best Call Back Number: [phone]   Additional Information: Pt states she would like to speak to staff in regards to upcoming procedure   Pt states pantoprazole (PROTONIX) 40 MG tablet isn't working  Pt states she would like a throat stretch during procedure  Dx for pancreas  Thanks

## 2024-12-13 ENCOUNTER — HOSPITAL ENCOUNTER (OUTPATIENT)
Facility: HOSPITAL | Age: 64
Discharge: HOME | End: 2024-12-13
Attending: INTERNAL MEDICINE | Admitting: INTERNAL MEDICINE
Payer: COMMERCIAL

## 2024-12-13 ENCOUNTER — ANESTHESIA (OUTPATIENT)
Dept: ENDOSCOPY | Facility: HOSPITAL | Age: 64
End: 2024-12-13
Payer: COMMERCIAL

## 2024-12-13 ENCOUNTER — ANESTHESIA EVENT (OUTPATIENT)
Dept: ENDOSCOPY | Facility: HOSPITAL | Age: 64
End: 2024-12-13
Payer: COMMERCIAL

## 2024-12-13 VITALS
TEMPERATURE: 98 F | DIASTOLIC BLOOD PRESSURE: 70 MMHG | SYSTOLIC BLOOD PRESSURE: 133 MMHG | OXYGEN SATURATION: 100 % | HEART RATE: 58 BPM | RESPIRATION RATE: 14 BRPM | HEIGHT: 60 IN | WEIGHT: 130 LBS | BODY MASS INDEX: 25.52 KG/M2

## 2024-12-13 DIAGNOSIS — K31.7 GASTRIC POLYP: Primary | ICD-10-CM

## 2024-12-13 DIAGNOSIS — K64.8 INTERNAL HEMORRHOIDS: ICD-10-CM

## 2024-12-13 DIAGNOSIS — R19.7 DIARRHEA: ICD-10-CM

## 2024-12-13 PROCEDURE — 25000003 PHARM REV CODE 250: Performed by: INTERNAL MEDICINE

## 2024-12-13 PROCEDURE — 43251 EGD REMOVE LESION SNARE: CPT | Performed by: INTERNAL MEDICINE

## 2024-12-13 PROCEDURE — 43251 EGD REMOVE LESION SNARE: CPT | Mod: ,,, | Performed by: INTERNAL MEDICINE

## 2024-12-13 PROCEDURE — 45380 COLONOSCOPY AND BIOPSY: CPT | Performed by: INTERNAL MEDICINE

## 2024-12-13 PROCEDURE — 27201012 HC FORCEPS, HOT/COLD, DISP: Performed by: INTERNAL MEDICINE

## 2024-12-13 PROCEDURE — 27201089 HC SNARE, DISP (ANY): Performed by: INTERNAL MEDICINE

## 2024-12-13 PROCEDURE — 43248 EGD GUIDE WIRE INSERTION: CPT | Performed by: INTERNAL MEDICINE

## 2024-12-13 PROCEDURE — C1769 GUIDE WIRE: HCPCS | Performed by: INTERNAL MEDICINE

## 2024-12-13 PROCEDURE — 43239 EGD BIOPSY SINGLE/MULTIPLE: CPT | Mod: 59,,, | Performed by: INTERNAL MEDICINE

## 2024-12-13 PROCEDURE — 37000008 HC ANESTHESIA 1ST 15 MINUTES: Performed by: INTERNAL MEDICINE

## 2024-12-13 PROCEDURE — 37000009 HC ANESTHESIA EA ADD 15 MINS: Performed by: INTERNAL MEDICINE

## 2024-12-13 PROCEDURE — 45380 COLONOSCOPY AND BIOPSY: CPT | Mod: ,,, | Performed by: INTERNAL MEDICINE

## 2024-12-13 PROCEDURE — 63600175 PHARM REV CODE 636 W HCPCS: Performed by: NURSE ANESTHETIST, CERTIFIED REGISTERED

## 2024-12-13 PROCEDURE — 43239 EGD BIOPSY SINGLE/MULTIPLE: CPT | Mod: 59 | Performed by: INTERNAL MEDICINE

## 2024-12-13 PROCEDURE — 43248 EGD GUIDE WIRE INSERTION: CPT | Mod: ,,, | Performed by: INTERNAL MEDICINE

## 2024-12-13 RX ORDER — PROPOFOL 10 MG/ML
VIAL (ML) INTRAVENOUS
Status: DISCONTINUED | OUTPATIENT
Start: 2024-12-13 | End: 2024-12-13

## 2024-12-13 RX ORDER — SODIUM CHLORIDE 9 MG/ML
INJECTION, SOLUTION INTRAVENOUS CONTINUOUS
Status: DISCONTINUED | OUTPATIENT
Start: 2024-12-13 | End: 2024-12-13 | Stop reason: HOSPADM

## 2024-12-13 RX ORDER — LIDOCAINE HYDROCHLORIDE 20 MG/ML
INJECTION INTRAVENOUS
Status: DISCONTINUED | OUTPATIENT
Start: 2024-12-13 | End: 2024-12-13

## 2024-12-13 RX ADMIN — PROPOFOL 50 MG: 10 INJECTION, EMULSION INTRAVENOUS at 10:12

## 2024-12-13 RX ADMIN — SODIUM CHLORIDE: 9 INJECTION, SOLUTION INTRAVENOUS at 09:12

## 2024-12-13 RX ADMIN — LIDOCAINE HYDROCHLORIDE 100 MG: 20 INJECTION, SOLUTION INTRAVENOUS at 10:12

## 2024-12-13 RX ADMIN — PROPOFOL 100 MG: 10 INJECTION, EMULSION INTRAVENOUS at 10:12

## 2024-12-13 NOTE — ANESTHESIA POSTPROCEDURE EVALUATION
Anesthesia Post Evaluation    Patient: Puma Oreilly    Procedure(s) Performed: Procedure(s) (LRB):  EGD (ESOPHAGOGASTRODUODENOSCOPY)(worried hx bypass drinking prep) (N/A)  COLONOSCOPY (N/A)    Final Anesthesia Type: general      Patient location during evaluation: PACU  Patient participation: Yes- Able to Participate  Level of consciousness: awake and alert  Post-procedure vital signs: reviewed and stable  Pain management: adequate  Airway patency: patent    PONV status at discharge: No PONV  Anesthetic complications: no      Cardiovascular status: hemodynamically stable  Respiratory status: unassisted and room air  Hydration status: euvolemic  Follow-up not needed.              Vitals Value Taken Time   /70 12/13/24 1105   Temp 36.6 °C (97.9 °F) 12/13/24 1105   Pulse 58 12/13/24 1105   Resp 14 12/13/24 1105   SpO2 100 % 12/13/24 1105         Event Time   Out of Recovery 11:18:04         Pain/Bonnie Score: Bonnie Score: 10 (12/13/2024 11:10 AM)

## 2024-12-13 NOTE — TRANSFER OF CARE
Anesthesia Transfer of Care Note    Patient: Puma Oreilly    Procedure(s) Performed: Procedure(s) (LRB):  EGD (ESOPHAGOGASTRODUODENOSCOPY)(worried hx bypass drinking prep) (N/A)  COLONOSCOPY (N/A)    Patient location: GI    Anesthesia Type: general    Transport from OR: Transported from OR on room air with adequate spontaneous ventilation    Post pain: adequate analgesia    Post assessment: no apparent anesthetic complications and tolerated procedure well    Post vital signs: stable    Level of consciousness: awake, alert and oriented    Nausea/Vomiting: no nausea/vomiting    Complications: none    Transfer of care protocol was followed    Last vitals: Visit Vitals  BP (!) 150/72 (BP Location: Left arm, Patient Position: Lying)   Pulse 70   Temp 36.6 °C (97.9 °F) (Skin)   Resp 16   Ht 5' (1.524 m)   Wt 59 kg (130 lb)   SpO2 100%   Breastfeeding No   BMI 25.39 kg/m²

## 2024-12-13 NOTE — PLAN OF CARE
Vss, maria del carmen po fluids, denies pain, ambulates easily. IV removed, catheter intact. Discharge instructions provided and states understanding. States ready to go home.  Discharged from facility with family per wheelchair.

## 2024-12-13 NOTE — INTERVAL H&P NOTE
The patient has been examined and the H&P has been reviewed:I concur with the findings and no changes have occurred since H&P was written.  Surgery risks, benefits and alternative options discussed and understood by patient/family.          There are no hospital problems to display for this patient.

## 2024-12-13 NOTE — PROVATION PATIENT INSTRUCTIONS
Discharge Summary/Instructions after an Endoscopic Procedure  Patient Name: Puma Good  Patient MRN: [id number]  Patient YOB: 1960 Friday, December 13, 2024  Hari Lamb MD  Dear patient,  As a result of recent federal legislation (The Federal Cures Act), you may   receive lab or pathology results from your procedure in your [facility]   account before your physician is able to contact you. Your physician or   their representative will relay the results to you with their   recommendations at their soonest availability.  Thank you,  RESTRICTIONS:  During your procedure today, you received medications for sedation.  These   medications may affect your judgment, balance and coordination.  Therefore,   for 24 hours, you have the following restrictions:   - DO NOT drive a car, operate machinery, make legal/financial decisions,   sign important papers or drink alcohol.    ACTIVITY:  Today: no heavy lifting, straining or running due to procedural   sedation/anesthesia.  The following day: return to full activity including work.  DIET:  Eat and drink normally unless instructed otherwise.     TREATMENT FOR COMMON SIDE EFFECTS:  - Mild abdominal pain, nausea, belching, bloating or excessive gas:  rest,   eat lightly and use a heating pad.  - Sore Throat: treat with throat lozenges and/or gargle with warm salt   water.  - Because air was used during the procedure, expelling large amounts of air   from your rectum or belching is normal.  - If a bowel prep was taken, you may not have a bowel movement for 1-3 days.    This is normal.  SYMPTOMS TO WATCH FOR AND REPORT TO YOUR PHYSICIAN:  1. Abdominal pain or bloating, other than gas cramps.  2. Chest pain.  3. Back pain.  4. Signs of infection such as: chills or fever occurring within 24 hours   after the procedure.  5. Rectal bleeding, which would show as bright red, maroon, or black stools.   (A tablespoon of blood from the rectum is not serious, especially  if   hemorrhoids are present.)  6. Vomiting.  7. Weakness or dizziness.  GO DIRECTLY TO THE NEAREST EMERGENCY ROOM IF YOU HAVE ANY OF THE FOLLOWING:      Difficulty breathing              Chills and/or fever over 101 F   Persistent vomiting and/or vomiting blood   Severe abdominal pain   Severe chest pain   Black, tarry stools   Bleeding- more than one tablespoon   Any other symptom or condition that you feel may need urgent attention  Your doctor recommends these additional instructions:  If any biopsies were taken, your doctors clinic will contact you in 1 to 2   weeks with any results.  - Patient has a contact number available for emergencies.  The signs and   symptoms of potential delayed complications were discussed with the   patient.  Return to normal activities tomorrow.  Written discharge   instructions were provided to the patient.   - High fiber diet.   - Continue present medications.   - Await pathology results.   - Repeat colonoscopy in 10 years for screening purposes.   - Discharge patient to home (ambulatory).   - Return to GI office after studies are complete.  For questions, problems or results please call your physician - Hari Lamb MD at Work:  [phone].  [facility], EMERGENCY ROOM PHONE NUMBER: [phone]  IF A COMPLICATION OR EMERGENCY SITUATION ARISES AND YOU ARE UNABLE TO REACH   YOUR PHYSICIAN - GO DIRECTLY TO THE EMERGENCY ROOM.  Hari Lamb MD  12/13/2024 10:39:01 AM  This report has been verified and signed electronically.  Dear patient,  As a result of recent federal legislation (The Federal Cures Act), you may   receive lab or pathology results from your procedure in your [facility]   account before your physician is able to contact you. Your physician or   their representative will relay the results to you with their   recommendations at their soonest availability.  Thank you,  PROVATION

## 2024-12-13 NOTE — ANESTHESIA PREPROCEDURE EVALUATION
12/13/2024  Puma Oreilly is a 64 y.o., female.      Pre-op Assessment    I have reviewed the Patient Summary Reports.     I have reviewed the Nursing Notes. I have reviewed the NPO Status.   I have reviewed the Medications.     Review of Systems  Anesthesia Hx:    PONV              Social:  Non-Smoker       Hematology/Oncology:  Hematology Normal   Oncology Normal                                   EENT/Dental:  EENT/Dental Normal           Cardiovascular:         Dysrhythmias                   Shortness of Breath                      Atrial Fibrillation     Pulmonary:      Shortness of breath                  Hepatic/GI:  Bowel Prep.   GERD         Gerd          Musculoskeletal:  Arthritis        Arthritis          Neurological:      Arthritis                               Physical Exam  General: Well nourished, Cooperative, Alert and Oriented    Airway:  Mallampati: II   Mouth Opening: Normal  TM Distance: Normal  Tongue: Normal  Neck ROM: Normal ROM    Dental:  Intact    Chest/Lungs:  Normal Respiratory Rate    Heart:  Rate: Normal        Anesthesia Plan  Type of Anesthesia, risks & benefits discussed:    Anesthesia Type: Gen Natural Airway  Intra-op Monitoring Plan: Standard ASA Monitors  Induction:  IV  Informed Consent: Informed consent signed with the Patient and all parties understand the risks and agree with anesthesia plan.  All questions answered.   ASA Score: 2  Day of Surgery Review of History & Physical: H&P Update referred to the surgeon/provider.    Ready For Surgery From Anesthesia Perspective.     .

## 2024-12-13 NOTE — PROVATION PATIENT INSTRUCTIONS
Discharge Summary/Instructions after an Endoscopic Procedure  Patient Name: Puma Good  Patient MRN: [id number]  Patient YOB: 1960 Friday, December 13, 2024  Hari Lamb MD  Dear patient,  As a result of recent federal legislation (The Federal Cures Act), you may   receive lab or pathology results from your procedure in your [facility]   account before your physician is able to contact you. Your physician or   their representative will relay the results to you with their   recommendations at their soonest availability.  Thank you,  RESTRICTIONS:  During your procedure today, you received medications for sedation.  These   medications may affect your judgment, balance and coordination.  Therefore,   for 24 hours, you have the following restrictions:   - DO NOT drive a car, operate machinery, make legal/financial decisions,   sign important papers or drink alcohol.    ACTIVITY:  Today: no heavy lifting, straining or running due to procedural   sedation/anesthesia.  The following day: return to full activity including work.  DIET:  Eat and drink normally unless instructed otherwise.     TREATMENT FOR COMMON SIDE EFFECTS:  - Mild abdominal pain, nausea, belching, bloating or excessive gas:  rest,   eat lightly and use a heating pad.  - Sore Throat: treat with throat lozenges and/or gargle with warm salt   water.  - Because air was used during the procedure, expelling large amounts of air   from your rectum or belching is normal.  - If a bowel prep was taken, you may not have a bowel movement for 1-3 days.    This is normal.  SYMPTOMS TO WATCH FOR AND REPORT TO YOUR PHYSICIAN:  1. Abdominal pain or bloating, other than gas cramps.  2. Chest pain.  3. Back pain.  4. Signs of infection such as: chills or fever occurring within 24 hours   after the procedure.  5. Rectal bleeding, which would show as bright red, maroon, or black stools.   (A tablespoon of blood from the rectum is not serious, especially  if   hemorrhoids are present.)  6. Vomiting.  7. Weakness or dizziness.  GO DIRECTLY TO THE NEAREST EMERGENCY ROOM IF YOU HAVE ANY OF THE FOLLOWING:      Difficulty breathing              Chills and/or fever over 101 F   Persistent vomiting and/or vomiting blood   Severe abdominal pain   Severe chest pain   Black, tarry stools   Bleeding- more than one tablespoon   Any other symptom or condition that you feel may need urgent attention  Your doctor recommends these additional instructions:  If any biopsies were taken, your doctors clinic will contact you in 1 to 2   weeks with any results.  - Patient has a contact number available for emergencies.  The signs and   symptoms of potential delayed complications were discussed with the   patient.  Return to normal activities tomorrow.  Written discharge   instructions were provided to the patient.   - Resume previous diet.   - Continue present medications.   - No aspirin, ibuprofen, naproxen, or other non-steroidal anti-inflammatory   drugs.   - Await pathology results.   - Discharge patient to home (ambulatory).   - Follow an antireflux regimen.   - Perform a colonoscopy after studies are complete.   - Return to GI office after studies are complete.  For questions, problems or results please call your physician - Hari Lamb MD at Work:  [phone].  [facility], EMERGENCY ROOM PHONE NUMBER: [phone]  IF A COMPLICATION OR EMERGENCY SITUATION ARISES AND YOU ARE UNABLE TO REACH   YOUR PHYSICIAN - GO DIRECTLY TO THE EMERGENCY ROOM.  Hari Lamb MD  12/13/2024 10:18:54 AM  This report has been verified and signed electronically.  Dear patient,  As a result of recent federal legislation (The Federal Cures Act), you may   receive lab or pathology results from your procedure in your [facility]   account before your physician is able to contact you. Your physician or   their representative will relay the results to you with their   recommendations at  their soonest availability.  Thank you,  PROVATION

## 2024-12-20 NOTE — PROGRESS NOTES
Please notify patient that biopsies reviewed and showed no bacteria.  Continue current meds and follow up as previously planned.  The polyp noted at the anastomosis was a precancerous polyp (not cancer) but did contain dysplasia.  Because of this, repeat EGD recommended at 6 weeks to re-examine this area.     Colonoscopy unremarkable.  No evidence of microscopic colitis.  Repeat colonoscopy in 10 years.

## 2024-12-24 ENCOUNTER — TELEPHONE (OUTPATIENT)
Dept: GASTROENTEROLOGY | Facility: CLINIC | Age: 64
End: 2024-12-24
Payer: COMMERCIAL

## 2024-12-24 NOTE — TELEPHONE ENCOUNTER
----- Message from Zayra sent at 12/24/2024  8:52 AM CST -----  Contact: Patient  Type:  Patient Returning Call    Who Called:  Patient  Who Left Message for Patient: Not sure  Does the patient know what this is regarding?:  Biopsy results - possibly    Would the patient rather a call back or a response via [facility]?   Call back  Best Call Back Number:  [phone]    Additional Information:   States she is returning a missed call - please call back - thank you

## 2025-01-06 ENCOUNTER — TELEPHONE (OUTPATIENT)
Dept: GASTROENTEROLOGY | Facility: CLINIC | Age: 65
End: 2025-01-06
Payer: COMMERCIAL

## 2025-01-06 DIAGNOSIS — K31.7 GASTRIC POLYPS: Primary | ICD-10-CM

## 2025-01-06 NOTE — TELEPHONE ENCOUNTER
Call placed to Ms. Oreilly in regards to message received. No answer, left message to return call.     **per NP Arle[facility] was faxed over to [employer] on 12/24/2024

## 2025-01-06 NOTE — TELEPHONE ENCOUNTER
----- Message from Ashley sent at 1/6/2025  4:17 PM CST -----  Regarding: return call  Contact: patient  Type:  Patient Returning Call    Who Called:  patient  Who Left Message for Patient:  Freida   Does the patient know what this is regarding?:    Best Call Back Number:  [phone] (home)     Additional Information:  Please call patient to advise.  Thanks!

## 2025-01-06 NOTE — TELEPHONE ENCOUNTER
----- Message from Arminda sent at 1/6/2025  2:56 PM CST -----   Type:  Needs Medical Advice    Who Called: PT    Would the patient rather a call back or a response via [facility]? Call  Best Call Back Number: [phone]        Additional Information: pt need know what if you applied for rx also need to apply for scope.  Please call back to advise. Thank you!

## 2025-01-06 NOTE — TELEPHONE ENCOUNTER
Call placed to Ms. Orelily in regards to message received. I informed her that Arleth ANDRADE sent the Rx to [facility], pt stated it has not been received. I advised her that I'm not sure, but she will need to submit her information. She verbalized understanding.     She also requested to scheduled her repeat EGD. EGD scheduled for 1/30 with Dr. Keen. She accepted. No further issues noted.

## 2025-01-24 ENCOUNTER — TELEPHONE (OUTPATIENT)
Dept: GASTROENTEROLOGY | Facility: CLINIC | Age: 65
End: 2025-01-24
Payer: COMMERCIAL

## 2025-01-24 NOTE — TELEPHONE ENCOUNTER
----- Message from Cristina sent at 1/24/2025  9:40 AM CST -----  Contact: pt  Type: Needs Medical Advice         Who Called: pt  Best Call Back Number:.[phone]    Additional Information: Requesting a call back regarding pt said she dropped of paperwork for rx lipase-protease-amylase (CREON) 36,000-114,000- 180,000 unit CpDR assistance and she is asking for a status update.  Pt stating to leave update message if she is unavailable     Please Advise- Thank you

## 2025-01-24 NOTE — TELEPHONE ENCOUNTER
Call placed to Ms. Kateryna in regards to message received. I informed her that her paperwork was faxed on 1/13/25 for [employer] pt assistance. I informed her that they will contact her with any further information. Number given for her to call them as well. No further issues noted.

## 2025-01-31 ENCOUNTER — ANESTHESIA EVENT (OUTPATIENT)
Dept: ENDOSCOPY | Facility: HOSPITAL | Age: 65
End: 2025-01-31
Payer: COMMERCIAL

## 2025-01-31 ENCOUNTER — HOSPITAL ENCOUNTER (OUTPATIENT)
Facility: HOSPITAL | Age: 65
Discharge: HOME | End: 2025-01-31
Attending: INTERNAL MEDICINE | Admitting: INTERNAL MEDICINE
Payer: COMMERCIAL

## 2025-01-31 ENCOUNTER — ANESTHESIA (OUTPATIENT)
Dept: ENDOSCOPY | Facility: HOSPITAL | Age: 65
End: 2025-01-31
Payer: COMMERCIAL

## 2025-01-31 DIAGNOSIS — K31.7 GASTRIC POLYPS: ICD-10-CM

## 2025-01-31 PROCEDURE — 43239 EGD BIOPSY SINGLE/MULTIPLE: CPT | Mod: ,,, | Performed by: INTERNAL MEDICINE

## 2025-01-31 PROCEDURE — 27201012 HC FORCEPS, HOT/COLD, DISP: Performed by: INTERNAL MEDICINE

## 2025-01-31 PROCEDURE — D9220A PRA ANESTHESIA: Mod: ANES,,, | Performed by: ANESTHESIOLOGY

## 2025-01-31 PROCEDURE — 37000008 HC ANESTHESIA 1ST 15 MINUTES: Performed by: INTERNAL MEDICINE

## 2025-01-31 PROCEDURE — 63600175 PHARM REV CODE 636 W HCPCS: Performed by: NURSE ANESTHETIST, CERTIFIED REGISTERED

## 2025-01-31 PROCEDURE — 37000009 HC ANESTHESIA EA ADD 15 MINS: Performed by: INTERNAL MEDICINE

## 2025-01-31 PROCEDURE — 43239 EGD BIOPSY SINGLE/MULTIPLE: CPT | Performed by: INTERNAL MEDICINE

## 2025-01-31 PROCEDURE — 25000003 PHARM REV CODE 250: Performed by: INTERNAL MEDICINE

## 2025-01-31 PROCEDURE — D9220A PRA ANESTHESIA: Mod: CRNA,,, | Performed by: NURSE ANESTHETIST, CERTIFIED REGISTERED

## 2025-01-31 RX ORDER — LIDOCAINE HYDROCHLORIDE 20 MG/ML
INJECTION, SOLUTION EPIDURAL; INFILTRATION; INTRACAUDAL; PERINEURAL
Status: DISCONTINUED
Start: 2025-01-31 | End: 2025-01-31 | Stop reason: HOSPADM

## 2025-01-31 RX ORDER — PROPOFOL 10 MG/ML
VIAL (ML) INTRAVENOUS
Status: DISCONTINUED | OUTPATIENT
Start: 2025-01-31 | End: 2025-01-31

## 2025-01-31 RX ORDER — PANTOPRAZOLE SODIUM 40 MG/1
40 TABLET, DELAYED RELEASE ORAL DAILY
Status: ON HOLD | COMMUNITY
End: 2025-01-31 | Stop reason: HOSPADM

## 2025-01-31 RX ORDER — PROPOFOL 10 MG/ML
INJECTION, EMULSION INTRAVENOUS
Status: COMPLETED
Start: 2025-01-31 | End: 2025-01-31

## 2025-01-31 RX ORDER — SODIUM CHLORIDE 9 MG/ML
INJECTION, SOLUTION INTRAVENOUS CONTINUOUS
Status: DISCONTINUED | OUTPATIENT
Start: 2025-01-31 | End: 2025-01-31 | Stop reason: HOSPADM

## 2025-01-31 RX ORDER — GLYCOPYRROLATE 0.2 MG/ML
INJECTION INTRAMUSCULAR; INTRAVENOUS
Status: DISCONTINUED
Start: 2025-01-31 | End: 2025-01-31 | Stop reason: HOSPADM

## 2025-01-31 RX ORDER — ESOMEPRAZOLE MAGNESIUM 40 MG/1
40 CAPSULE, DELAYED RELEASE ORAL
Qty: 90 CAPSULE | Refills: 3 | Status: SHIPPED | OUTPATIENT
Start: 2025-01-31 | End: 2026-01-31

## 2025-01-31 RX ORDER — LIDOCAINE HYDROCHLORIDE 20 MG/ML
INJECTION INTRAVENOUS
Status: DISCONTINUED | OUTPATIENT
Start: 2025-01-31 | End: 2025-01-31

## 2025-01-31 RX ADMIN — SODIUM CHLORIDE: 9 INJECTION, SOLUTION INTRAVENOUS at 11:01

## 2025-01-31 RX ADMIN — PROPOFOL 80 MG: 10 INJECTION, EMULSION INTRAVENOUS at 12:01

## 2025-01-31 RX ADMIN — LIDOCAINE HYDROCHLORIDE 75 MG: 20 INJECTION, SOLUTION INTRAVENOUS at 12:01

## 2025-01-31 RX ADMIN — PROPOFOL 40 MG: 10 INJECTION, EMULSION INTRAVENOUS at 12:01

## 2025-01-31 NOTE — H&P
CC: Follow up of gastric polyp with dysplasia    64 year old female with above. States that symptoms are absent, no alleviating/exacerbating factors.  No bleeding or weight loss.     ROS:  No headache, no fever/chills, no chest pain/SOB, no nausea/vomiting/diarrhea/constipation/GI bleeding/abdominal pain, no dysuria/hematuria.    VSSAF   Exam:   Alert and oriented x 3; no apparent distress   PERRLA, sclera anicteric  CV: Regular rate/rhythm, normal PMI   Lungs: Clear bilaterally with no wheeze/rales   Abdomen: Soft, NT/ND, normal bowel sounds   Ext: No cyanosis, clubbing     Impression:   As above    Plan:   Proceed with endoscopy. Further recs to follow.

## 2025-01-31 NOTE — TRANSFER OF CARE
Anesthesia Transfer of Care Note    Patient: Puma Oreilly    Procedure(s) Performed: Procedure(s) (LRB):  EGD (ESOPHAGOGASTRODUODENOSCOPY) (N/A)    Patient location: PACU    Anesthesia Type: general    Transport from OR: Transported from OR on room air with adequate spontaneous ventilation    Post pain: adequate analgesia    Post assessment: no apparent anesthetic complications and tolerated procedure well    Post vital signs: stable    Level of consciousness: sedated and responds to stimulation    Nausea/Vomiting: no nausea/vomiting    Complications: none    Transfer of care protocol was followed      Last vitals: Visit Vitals  BP (!) 190/87 (BP Location: Left arm, Patient Position: Lying)   Pulse 61   Temp 36.8 °C (98.2 °F) (Skin)   Resp 18   Wt 59 kg (130 lb)   SpO2 100%   Breastfeeding No   BMI 25.39 kg/m²

## 2025-01-31 NOTE — PROVATION PATIENT INSTRUCTIONS
Discharge Summary/Instructions after an Endoscopic Procedure  Patient Name: Puma Good  Patient MRN: [id number]  Patient YOB: 1960 Friday, January 31, 2025  Hari Lamb MD  Dear patient,  As a result of recent federal legislation (The Federal Cures Act), you may   receive lab or pathology results from your procedure in your [facility]   account before your physician is able to contact you. Your physician or   their representative will relay the results to you with their   recommendations at their soonest availability.  Thank you,  RESTRICTIONS:  During your procedure today, you received medications for sedation.  These   medications may affect your judgment, balance and coordination.  Therefore,   for 24 hours, you have the following restrictions:   - DO NOT drive a car, operate machinery, make legal/financial decisions,   sign important papers or drink alcohol.    ACTIVITY:  Today: no heavy lifting, straining or running due to procedural   sedation/anesthesia.  The following day: return to full activity including work.  DIET:  Eat and drink normally unless instructed otherwise.     TREATMENT FOR COMMON SIDE EFFECTS:  - Mild abdominal pain, nausea, belching, bloating or excessive gas:  rest,   eat lightly and use a heating pad.  - Sore Throat: treat with throat lozenges and/or gargle with warm salt   water.  - Because air was used during the procedure, expelling large amounts of air   from your rectum or belching is normal.  - If a bowel prep was taken, you may not have a bowel movement for 1-3 days.    This is normal.  SYMPTOMS TO WATCH FOR AND REPORT TO YOUR PHYSICIAN:  1. Abdominal pain or bloating, other than gas cramps.  2. Chest pain.  3. Back pain.  4. Signs of infection such as: chills or fever occurring within 24 hours   after the procedure.  5. Rectal bleeding, which would show as bright red, maroon, or black stools.   (A tablespoon of blood from the rectum is not serious, especially  if   hemorrhoids are present.)  6. Vomiting.  7. Weakness or dizziness.  GO DIRECTLY TO THE NEAREST EMERGENCY ROOM IF YOU HAVE ANY OF THE FOLLOWING:      Difficulty breathing              Chills and/or fever over 101 F   Persistent vomiting and/or vomiting blood   Severe abdominal pain   Severe chest pain   Black, tarry stools   Bleeding- more than one tablespoon   Any other symptom or condition that you feel may need urgent attention  Your doctor recommends these additional instructions:  If any biopsies were taken, your doctors clinic will contact you in 1 to 2   weeks with any results.  - Patient has a contact number available for emergencies.  The signs and   symptoms of potential delayed complications were discussed with the   patient.  Return to normal activities tomorrow.  Written discharge   instructions were provided to the patient.   - Resume previous diet.   - Continue present medications.   - No aspirin, ibuprofen, naproxen, or other non-steroidal anti-inflammatory   drugs.   - Await pathology results.   - Repeat upper endoscopy in 6 months for surveillance.   - Discharge patient to home (ambulatory).   - Follow an antireflux regimen.   - Return to GI office after studies are complete.  For questions, problems or results please call your physician - Hari Lamb MD at Work:  [phone].  [facility], EMERGENCY ROOM PHONE NUMBER: [phone]  IF A COMPLICATION OR EMERGENCY SITUATION ARISES AND YOU ARE UNABLE TO REACH   YOUR PHYSICIAN - GO DIRECTLY TO THE EMERGENCY ROOM.  Hari Lamb MD  1/31/2025 12:11:36 PM  This report has been verified and signed electronically.  Dear patient,  As a result of recent federal legislation (The Federal Cures Act), you may   receive lab or pathology results from your procedure in your [facility]   account before your physician is able to contact you. Your physician or   their representative will relay the results to you with their   recommendations at  their soonest availability.  Thank you,  PROVATION

## 2025-01-31 NOTE — ANESTHESIA PREPROCEDURE EVALUATION
01/31/2025  Puma Oreilly is a 64 y.o., female.      Pre-op Assessment    I have reviewed the Patient Summary Reports.     I have reviewed the Nursing Notes. I have reviewed the NPO Status.   I have reviewed the Medications.     Review of Systems  Anesthesia Hx:    PONV              Social:  Non-Smoker       Hematology/Oncology:  Hematology Normal   Oncology Normal                                   EENT/Dental:  EENT/Dental Normal           Cardiovascular:         Dysrhythmias                                      Pulmonary:      Shortness of breath                  Hepatic/GI:  No Bowel Prep.   GERD                Musculoskeletal:  Arthritis                   Physical Exam  General: Well nourished, Cooperative, Alert and Oriented    Airway:  Mallampati: II   Mouth Opening: Normal  TM Distance: Normal  Tongue: Normal  Neck ROM: Normal ROM    Dental:  Intact    Chest/Lungs:  Normal Respiratory Rate    Heart:  Rate: Normal        Anesthesia Plan  Type of Anesthesia, risks & benefits discussed:    Anesthesia Type: Gen Natural Airway  Intra-op Monitoring Plan: Standard ASA Monitors  Induction:  IV  Informed Consent: Informed consent signed with the Patient and all parties understand the risks and agree with anesthesia plan.  All questions answered.   ASA Score: 2  Day of Surgery Review of History & Physical: H&P Update referred to the surgeon/provider.    Ready For Surgery From Anesthesia Perspective.     .

## 2025-02-03 VITALS
RESPIRATION RATE: 18 BRPM | SYSTOLIC BLOOD PRESSURE: 140 MMHG | BODY MASS INDEX: 25.39 KG/M2 | OXYGEN SATURATION: 100 % | HEART RATE: 51 BPM | DIASTOLIC BLOOD PRESSURE: 75 MMHG | WEIGHT: 130 LBS | TEMPERATURE: 98 F

## 2025-02-03 NOTE — ANESTHESIA POSTPROCEDURE EVALUATION
Anesthesia Post Evaluation    Patient: Puma Oreilly    Procedure(s) Performed: Procedure(s) (LRB):  EGD (ESOPHAGOGASTRODUODENOSCOPY) (N/A)    Final Anesthesia Type: general      Patient location during evaluation: PACU  Patient participation: Yes- Able to Participate  Level of consciousness: awake and alert and oriented  Post-procedure vital signs: reviewed and stable  Pain management: adequate  Airway patency: patent    PONV status at discharge: No PONV  Anesthetic complications: no      Cardiovascular status: blood pressure returned to baseline  Respiratory status: unassisted, spontaneous ventilation and room air  Hydration status: euvolemic  Follow-up not needed.              Vitals Value Taken Time   /75 01/31/25 1232   Temp 36.7 °C (98.1 °F) 01/31/25 1232   Pulse 51 01/31/25 1232   Resp 18 01/31/25 1232   SpO2 100 % 01/31/25 1232         Event Time   Out of Recovery 12:41:46         Pain/Bonnie Score: No data recorded

## 2025-02-06 ENCOUNTER — TELEPHONE (OUTPATIENT)
Dept: GASTROENTEROLOGY | Facility: CLINIC | Age: 65
End: 2025-02-06
Payer: COMMERCIAL

## 2025-02-06 NOTE — TELEPHONE ENCOUNTER
----- Message from Lia sent at 2/6/2025 10:18 AM CST -----  Contact: self  Type:  Patient Returning Call    Who Called:pt     Who Left Message for Patient:n/a     Does the patient know what this is regarding?:yes     Would the patient rather a call back or a response via [facility]? Call back     Best Call Back Number:[phone]      Additional Information:   Please call back to advise. Thanks!

## 2025-02-07 ENCOUNTER — TELEPHONE (OUTPATIENT)
Dept: GASTROENTEROLOGY | Facility: CLINIC | Age: 65
End: 2025-02-07
Payer: COMMERCIAL

## 2025-02-07 NOTE — TELEPHONE ENCOUNTER
----- Message from [employer] sent at 2/7/2025 12:34 PM CST -----  Type: Needs Medical Advice  Who Called:  pt  Best Call Back Number: [phone]  Additional Information: pt is calling in regards to a missed call from the office to go over results. 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 Pt states that she gets home at 4 and will need to get a call back ASAP, preferably today after 4pm. Please call back and advise. Thanks!

## 2025-02-07 NOTE — TELEPHONE ENCOUNTER
Call placed to Ms. Ayalae in regards to message received. EGD results reviewed from 1/31/25. Advised per Dr. Lamb Please notify patient that biopsies reviewed and showed no bacteria.  Continue current meds and follow up as previously planned. She verbalized understanding. No further issues noted.     Recall placed for 6 mth f/u EGD.

## 2025-04-14 ENCOUNTER — TELEPHONE (OUTPATIENT)
Dept: GASTROENTEROLOGY | Facility: CLINIC | Age: 65
End: 2025-04-14
Payer: COMMERCIAL

## 2025-04-14 NOTE — TELEPHONE ENCOUNTER
Call placed to Ms. Oreilly in regards to reviewing her pathology results. No answer, left message to recall call to office.

## 2025-04-15 ENCOUNTER — TELEPHONE (OUTPATIENT)
Dept: GASTROENTEROLOGY | Facility: CLINIC | Age: 65
End: 2025-04-15
Payer: COMMERCIAL

## 2025-04-15 NOTE — TELEPHONE ENCOUNTER
----- Message from Sandra sent at 4/15/2025  8:06 AM CDT -----  Regarding: Returning phone call  Contact: pt  Type:  Patient Returning CallWho Called:ptWho Left Message for Patient:Gabby the patient know what this is regarding?:schedulingWould the patient rather a call back or a response via [facility]? Call [facility] Call Back Number:[phone] Information:  returning a call

## 2025-05-25 ENCOUNTER — OFFICE VISIT (OUTPATIENT)
Dept: URGENT CARE | Facility: CLINIC | Age: 65
End: 2025-05-25
Payer: COMMERCIAL

## 2025-05-25 VITALS
BODY MASS INDEX: 27.48 KG/M2 | SYSTOLIC BLOOD PRESSURE: 147 MMHG | HEIGHT: 60 IN | TEMPERATURE: 97 F | OXYGEN SATURATION: 98 % | RESPIRATION RATE: 18 BRPM | HEART RATE: 76 BPM | DIASTOLIC BLOOD PRESSURE: 90 MMHG | WEIGHT: 140 LBS

## 2025-05-25 DIAGNOSIS — U07.1 COVID-19 VIRUS INFECTION: ICD-10-CM

## 2025-05-25 DIAGNOSIS — R09.81 NASAL CONGESTION: ICD-10-CM

## 2025-05-25 DIAGNOSIS — J02.9 SORE THROAT: Primary | ICD-10-CM

## 2025-05-25 DIAGNOSIS — R00.0 TACHYCARDIA: ICD-10-CM

## 2025-05-25 LAB
CTP QC/QA: YES
FLUAV AG NPH QL: NEGATIVE
FLUBV AG NPH QL: NEGATIVE
S PYO RRNA THROAT QL PROBE: NEGATIVE
SARS-COV+SARS-COV-2 AG RESP QL IA.RAPID: POSITIVE

## 2025-05-25 PROCEDURE — 99214 OFFICE O/P EST MOD 30 MIN: CPT | Mod: S$GLB,,, | Performed by: NURSE PRACTITIONER

## 2025-05-25 PROCEDURE — 87811 SARS-COV-2 COVID19 W/OPTIC: CPT | Mod: QW,S$GLB,, | Performed by: NURSE PRACTITIONER

## 2025-05-25 PROCEDURE — 93000 ELECTROCARDIOGRAM COMPLETE: CPT | Mod: S$GLB,,, | Performed by: NURSE PRACTITIONER

## 2025-05-25 PROCEDURE — 87804 INFLUENZA ASSAY W/OPTIC: CPT | Mod: QW,,, | Performed by: NURSE PRACTITIONER

## 2025-05-25 PROCEDURE — 87880 STREP A ASSAY W/OPTIC: CPT | Mod: QW,,, | Performed by: NURSE PRACTITIONER

## 2025-05-25 NOTE — PROGRESS NOTES
Subjective:      Patient ID: Puma Oreilly is a 65 y.o. female.    Vitals:  height is 5' (1.524 m) and weight is 63.5 kg (140 lb). Her oral temperature is 97.4 °F (36.3 °C). Her blood pressure is 147/90 (abnormal) and her pulse is 76. Her respiration is 18 and oxygen saturation is 98%.     Chief Complaint: Sore Throat    Sore Throat   This is a new problem. The current episode started in the past 7 days. The problem has been gradually worsening. There has been no fever. The pain is at a severity of 5/10. The pain is moderate. Associated symptoms include ear pain. Pertinent negatives include no coughing, diarrhea or vomiting. Associated symptoms comments: Nasal congestion. Treatments tried: over the counter. The treatment provided no relief.       Constitution: Negative for fever.   HENT:  Positive for ear pain and sore throat.    Cardiovascular:  Negative for chest pain.   Respiratory:  Negative for cough.    Gastrointestinal:  Negative for nausea, vomiting and diarrhea.   Genitourinary:  Negative for dysuria.      Objective:     Past Medical History:   Diagnosis Date    Breast, fat necrosis     bilateral    GERD (gastroesophageal reflux disease)     PONV (postoperative nausea and vomiting)     Wears glasses        Past Surgical History:   Procedure Laterality Date    ABDOMINAL SURGERY      abdominalplasty    ABLATION, SVT, ACCESSORY PATHWAY N/A 2024    Procedure: Ablation, SVT, Accessory Pathway;  Surgeon: Luis Romero MD;  Location: [facility];  Service: Cardiology;  Laterality: N/A;  SVT, RFA, Carto, MAC, GP, 3 Prep    AUGMENTATION OF BREAST      breast augmentation      breast reduction      CARPAL TUNNEL RELEASE      right    carpel tunnel       SECTION, CLASSIC      COLONOSCOPY N/A 2024    Procedure: COLONOSCOPY;  Surgeon: Hari Keen MD;  Location: [facility];  Service: Endoscopy;  Laterality: N/A;    ESOPHAGOGASTRODUODENOSCOPY N/A 2024    Procedure: EGD  (ESOPHAGOGASTRODUODENOSCOPY)(worried hx bypass drinking prep);  Surgeon: Hari Keen MD;  Location: [facility];  Service: Endoscopy;  Laterality: N/A;    ESOPHAGOGASTRODUODENOSCOPY N/A 1/31/2025    Procedure: EGD (ESOPHAGOGASTRODUODENOSCOPY);  Surgeon: Hari Keen MD;  Location: [facility] END[facility];  Service: Endoscopy;  Laterality: N/A;    esphogeal stricture      GASTRIC BYPASS  2009    HYSTERECTOMY      PARTIAL NEPHRECTOMY  2011    Rt Kidney    SHOULDER SURGERY      SMALL INTESTINE SURGERY      SPINAL FUSION  3/2015    ACDF  Dr Pichardo    THIGH LIFT      TONSILLECTOMY, ADENOIDECTOMY      TOTAL REDUCTION MAMMOPLASTY         Family History   Problem Relation Name Age of Onset    Diabetes Mother      Prostate cancer Father      Stroke Maternal Aunt      Cancer Maternal Grandmother          stomach    Lung cancer Maternal Grandmother      Diabetes Maternal Grandfather      Cancer Paternal Grandmother          lung       Social History     Socioeconomic History    Marital status:    Occupational History     Employer: [employer]   Tobacco Use    Smoking status: Never    Smokeless tobacco: Never   Substance and Sexual Activity    Alcohol use: No    Drug use: No       Current Medications[1]    Review of patient's allergies indicates:   Allergen Reactions    Hydrocodone Nausea Only    Oxycodone        Physical Exam   Constitutional: She is oriented to person, place, and time.   HENT:   Head: Normocephalic.   Ears:   Right Ear: Ear canal normal.   Left Ear: Ear canal normal.      Comments: B TM injected  Mouth/Throat: Posterior oropharyngeal erythema present. No oropharyngeal exudate.   Eyes: Conjunctivae are normal.   Cardiovascular: Normal rate and regular rhythm.   Pulmonary/Chest: Effort normal and breath sounds normal.   Abdominal: Normal appearance.   Neurological: She is alert and oriented to person, place, and time.   Skin:         Comments: Soft mobile nodule to R DIP of thumb no erythema,  warmth or break in skin    Psychiatric: Mood, judgment and thought content normal.   Nursing note and vitals reviewed.      Assessment:     1. Sore throat    2. Nasal congestion    3. COVID-19 virus infection    4. Tachycardia        Plan:       Sore throat  -     POCT rapid strep A  -     SARS Coronavirus 2 Antigen, POCT Manual Read  -     POCT Influenza A/B Rapid Antigen    Nasal congestion  -     POCT rapid strep A  -     SARS Coronavirus 2 Antigen, POCT Manual Read  -     POCT Influenza A/B Rapid Antigen    COVID-19 virus infection    Tachycardia  -     EKG 12-lead; Future    Pt presents with sore throat, headache and fatigue for approx 3 days. On exam TM injected, no evidence of OM, bacterial pharyngitis and lungs clear, doubt pneumonia. She is Covid +, strep and flu (-). Discussed Paxlovid and deferred. The additionally had mobile nodule to R thumb, does not appear to be abscess, discussed hand ortho evaluation. Pt additionally was concerned she has SVT and had heart racing few days (Denies CP, SOB, BUTLER). HR 80 on auscultation, reassured by EKG without noted changes from prior EKGs. Discussed symptomatic treatment and return precautions.                      [1]   Current Outpatient Medications   Medication Sig Dispense Refill    esomeprazole (NEXIUM) 40 MG capsule Take 1 capsule (40 mg total) by mouth before breakfast. 90 capsule 3    metoprolol succinate (TOPROL-XL) 25 MG 24 hr tablet Take 1 tablet (25 mg total) by mouth once daily. 90 tablet 3    lipase-protease-amylase (CREON) 36,000-114,000- 180,000 unit CpDR Take one capsule by mouth with each meal and a snack. ? (Patient not taking: Reported on 5/25/2025) 112 capsule 11     No current facility-administered medications for this visit.

## 2025-05-29 ENCOUNTER — TELEPHONE (OUTPATIENT)
Dept: GASTROENTEROLOGY | Facility: CLINIC | Age: 65
End: 2025-05-29
Payer: COMMERCIAL

## 2025-05-29 NOTE — TELEPHONE ENCOUNTER
Ms. Oreilly called in and stated she got a letter that she is to report for jury duty, but she was diagnosed with exocrine pancreatic insufficiency. 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 She stated she needs a note to turn in stating she cannot serve due to her ongoing GI issues. I advised her that I would send a message to Nimisha Conklin NP and ask.     She also stated she would like to know if the biopsies done on 1/31/25 was new growth or is that was left from her EGD 12/24. I advised her per Dr. Nava procedure note on 1/31/25 that she needed to repeat her EGD in 6 month for surveillance. She state to please find out if the repeat still needs to be done or not. No further issues noted.

## 2025-06-02 ENCOUNTER — TELEPHONE (OUTPATIENT)
Dept: GASTROENTEROLOGY | Facility: CLINIC | Age: 65
End: 2025-06-02
Payer: COMMERCIAL

## 2025-06-16 ENCOUNTER — TELEPHONE (OUTPATIENT)
Dept: GASTROENTEROLOGY | Facility: CLINIC | Age: 65
End: 2025-06-16
Payer: COMMERCIAL

## 2025-06-16 NOTE — TELEPHONE ENCOUNTER
Copied from CRM #[id number]. Topic: General Inquiry - Patient Advice  >> Jun 16, 2025  3:01 PM Aparna wrote:  Type: Needs Medical Advice  Who Called:  Pt    Pharmacy name and phone #:    [id number] - [place] - [address]  [address] [place] [address]  Phone: [phone] Fax: [phone]      Best Call Back Number: [phone]    Additional Information: Pt states she has been waiting to get creon, please call creon directly to find out what she's needing, please call  Tavia Valenzuela at  [phone], call patient if any questions

## 2025-06-20 ENCOUNTER — TELEPHONE (OUTPATIENT)
Dept: GASTROENTEROLOGY | Facility: CLINIC | Age: 65
End: 2025-06-20
Payer: COMMERCIAL

## 2025-06-20 NOTE — TELEPHONE ENCOUNTER
Copied from CRM #[id number]. Topic: Medications - Medication Status Check   >> Jun 20, 2025 12:12 PM Ambar wrote:  Type:  RX Refill Request    Who Called: pt   Refill or New Rx:refill     RX Name and Strength:lipase-protease-amylase (CREON) 36,000-114,000- 180,000 unit CpDR    How is the patient currently taking it? (ex. 1XDay):as directed   Is this a 30 day or 90 day RX:1 year     Preferred Pharmacy with phone number:  [id number] - [place]  [address]  [address] [place] [address]  Phone: [phone] Fax: [phone]        Local or Mail Order:local   Ordering Provider:mirian     Would the patient rather a call back or a response via [facility]? Call back     Best Call Back Number:[phone]    Additional Information:  please call to advise, Thank You.

## 2025-06-23 ENCOUNTER — TELEPHONE (OUTPATIENT)
Dept: GASTROENTEROLOGY | Facility: CLINIC | Age: 65
End: 2025-06-23
Payer: COMMERCIAL

## 2025-06-23 NOTE — TELEPHONE ENCOUNTER
Copied from CRM #[id number]. Topic: Medications - New Medication Request  >> 2025  8:28 AM Mariam wrote:  Type:  Needs Medical Advice    Who Called: self  Symptoms (please be specific): pt is requesting np to approve lipase-protease-amylase (Creon) request they will fax it over to np, she will also need to update a new rx with 12 refills. Pt sts it had , needs a new one.    Pharmacy name and phone #:    [id number] - [place] -  [address]   [place] [address]  Phone: [phone] Fax: [phone]      Would the patient rather a call back or a response via [facility]? call  Best Call Back Number: [phone] (home)    Additional Information: please let her know when this is all completed please advise and thank you.

## 2025-06-24 ENCOUNTER — TELEPHONE (OUTPATIENT)
Dept: GASTROENTEROLOGY | Facility: CLINIC | Age: 65
End: 2025-06-24
Payer: COMMERCIAL

## 2025-06-24 NOTE — TELEPHONE ENCOUNTER
Attempted to call patient to notify her that [employer] approved her application, no answer.

## 2025-06-25 ENCOUNTER — TELEPHONE (OUTPATIENT)
Dept: GASTROENTEROLOGY | Facility: CLINIC | Age: 65
End: 2025-06-25
Payer: COMMERCIAL

## 2025-06-25 NOTE — TELEPHONE ENCOUNTER
Returned pt call, spoke with pt  Franko. He state pt cell is [phone]    Called pt, no answer, unable to leave vm- not setup

## 2025-06-25 NOTE — TELEPHONE ENCOUNTER
Copied from CRM #[id number]. Topic: Appointments - Appointment Access  >> Jun 25, 2025 11:33 AM Zeina wrote:  Pt needs to schedule procedure. Please call her at [phone]. Thank you

## 2025-06-26 ENCOUNTER — TELEPHONE (OUTPATIENT)
Dept: GASTROENTEROLOGY | Facility: CLINIC | Age: 65
End: 2025-06-26
Payer: COMMERCIAL

## 2025-06-26 NOTE — TELEPHONE ENCOUNTER
Copied from CRM #[id number]. Topic: General Inquiry - Return Call  >> Jun 26, 2025  4:15 PM Ambar wrote:  Type:  Patient Returning Call    Who Called:pt     Who Left Message for Patient:June     Does the patient know what this is regarding?:yes , scheduling     Would the patient rather a call back or a response via [facility]? Call Back     Best Call Back Number: [phone]     Additional Information: please contact and advise , thank you

## 2025-06-27 ENCOUNTER — TELEPHONE (OUTPATIENT)
Dept: GASTROENTEROLOGY | Facility: CLINIC | Age: 65
End: 2025-06-27
Payer: COMMERCIAL

## 2025-06-27 DIAGNOSIS — K31.7 GASTRIC POLYPS: Primary | ICD-10-CM

## 2025-06-27 NOTE — TELEPHONE ENCOUNTER
Copied from CRM #[id number]. Topic: Appointments - Appointment Access  >> Jun 27, 2025 12:55 PM Zayra wrote:  Type:  Appointment Request    Caller is requesting a sooner appointment.  Caller declined first available appointment listed below.  Caller will not accept being placed on the waitlist and is requesting a message be sent to doctor.    Name of Caller:  Patient  When is the first available appointment?  N/A    Would the patient rather a call back or a response via [facility]?   Call back  Best Call Back Number:  [phone]    Additional Information:   States she would like to speak with someone to schedule her six-month endoscopy - please call - thank you

## 2025-06-27 NOTE — TELEPHONE ENCOUNTER
Spoke with patient via phone. Repeat EGD scheduled for 7/7. Patient will  prep instructions in clinic. Denies any use of diabetic weight loss medication or blood thinners.   Confirmed with patient that her [employer] assist was approved.

## 2025-07-07 ENCOUNTER — ANESTHESIA EVENT (OUTPATIENT)
Dept: ENDOSCOPY | Facility: HOSPITAL | Age: 65
End: 2025-07-07
Payer: COMMERCIAL

## 2025-07-07 ENCOUNTER — HOSPITAL ENCOUNTER (OUTPATIENT)
Facility: HOSPITAL | Age: 65
Discharge: HOME | End: 2025-07-07
Attending: INTERNAL MEDICINE | Admitting: INTERNAL MEDICINE
Payer: COMMERCIAL

## 2025-07-07 ENCOUNTER — ANESTHESIA (OUTPATIENT)
Dept: ENDOSCOPY | Facility: HOSPITAL | Age: 65
End: 2025-07-07
Payer: COMMERCIAL

## 2025-07-07 ENCOUNTER — TELEPHONE (OUTPATIENT)
Dept: GASTROENTEROLOGY | Facility: CLINIC | Age: 65
End: 2025-07-07
Payer: COMMERCIAL

## 2025-07-07 VITALS
RESPIRATION RATE: 18 BRPM | WEIGHT: 140 LBS | SYSTOLIC BLOOD PRESSURE: 132 MMHG | OXYGEN SATURATION: 99 % | DIASTOLIC BLOOD PRESSURE: 80 MMHG | HEIGHT: 60 IN | TEMPERATURE: 98 F | HEART RATE: 56 BPM | BODY MASS INDEX: 27.48 KG/M2

## 2025-07-07 DIAGNOSIS — Z87.19 HISTORY OF GASTRIC POLYP: ICD-10-CM

## 2025-07-07 PROCEDURE — 25000003 PHARM REV CODE 250: Performed by: INTERNAL MEDICINE

## 2025-07-07 PROCEDURE — 43235 EGD DIAGNOSTIC BRUSH WASH: CPT | Performed by: INTERNAL MEDICINE

## 2025-07-07 PROCEDURE — 43235 EGD DIAGNOSTIC BRUSH WASH: CPT | Mod: ,,, | Performed by: INTERNAL MEDICINE

## 2025-07-07 PROCEDURE — 37000008 HC ANESTHESIA 1ST 15 MINUTES: Performed by: INTERNAL MEDICINE

## 2025-07-07 PROCEDURE — 63600175 PHARM REV CODE 636 W HCPCS: Performed by: NURSE ANESTHETIST, CERTIFIED REGISTERED

## 2025-07-07 RX ORDER — PROPOFOL 10 MG/ML
VIAL (ML) INTRAVENOUS
Status: DISCONTINUED | OUTPATIENT
Start: 2025-07-07 | End: 2025-07-07

## 2025-07-07 RX ORDER — SODIUM CHLORIDE 9 MG/ML
INJECTION, SOLUTION INTRAVENOUS CONTINUOUS
Status: DISCONTINUED | OUTPATIENT
Start: 2025-07-07 | End: 2025-07-07 | Stop reason: HOSPADM

## 2025-07-07 RX ORDER — LIDOCAINE HYDROCHLORIDE 20 MG/ML
INJECTION INTRAVENOUS
Status: DISCONTINUED | OUTPATIENT
Start: 2025-07-07 | End: 2025-07-07

## 2025-07-07 RX ADMIN — SODIUM CHLORIDE: 9 INJECTION, SOLUTION INTRAVENOUS at 01:07

## 2025-07-07 RX ADMIN — PROPOFOL 150 MG: 10 INJECTION, EMULSION INTRAVENOUS at 02:07

## 2025-07-07 RX ADMIN — LIDOCAINE HYDROCHLORIDE 100 MG: 20 INJECTION, SOLUTION INTRAVENOUS at 02:07

## 2025-07-07 NOTE — PROVATION PATIENT INSTRUCTIONS
Discharge Summary/Instructions after an Endoscopic Procedure  Patient Name: Puma Good  Patient MRN: [id number]  Patient YOB: 1960 Monday, July 7, 2025  Hari Lamb MD  Dear patient,  As a result of recent federal legislation (The Federal Cures Act), you may   receive lab or pathology results from your procedure in your [facility]   account before your physician is able to contact you. Your physician or   their representative will relay the results to you with their   recommendations at their soonest availability.  Thank you,  RESTRICTIONS:  During your procedure today, you received medications for sedation.  These   medications may affect your judgment, balance and coordination.  Therefore,   for 24 hours, you have the following restrictions:   - DO NOT drive a car, operate machinery, make legal/financial decisions,   sign important papers or drink alcohol.    ACTIVITY:  Today: no heavy lifting, straining or running due to procedural   sedation/anesthesia.  The following day: return to full activity including work.  DIET:  Eat and drink normally unless instructed otherwise.     TREATMENT FOR COMMON SIDE EFFECTS:  - Mild abdominal pain, nausea, belching, bloating or excessive gas:  rest,   eat lightly and use a heating pad.  - Sore Throat: treat with throat lozenges and/or gargle with warm salt   water.  - Because air was used during the procedure, expelling large amounts of air   from your rectum or belching is normal.  - If a bowel prep was taken, you may not have a bowel movement for 1-3 days.    This is normal.  SYMPTOMS TO WATCH FOR AND REPORT TO YOUR PHYSICIAN:  1. Abdominal pain or bloating, other than gas cramps.  2. Chest pain.  3. Back pain.  4. Signs of infection such as: chills or fever occurring within 24 hours   after the procedure.  5. Rectal bleeding, which would show as bright red, maroon, or black stools.   (A tablespoon of blood from the rectum is not serious, especially if    hemorrhoids are present.)  6. Vomiting.  7. Weakness or dizziness.  GO DIRECTLY TO THE NEAREST EMERGENCY ROOM IF YOU HAVE ANY OF THE FOLLOWING:      Difficulty breathing              Chills and/or fever over 101 F   Persistent vomiting and/or vomiting blood   Severe abdominal pain   Severe chest pain   Black, tarry stools   Bleeding- more than one tablespoon   Any other symptom or condition that you feel may need urgent attention  Your doctor recommends these additional instructions:  If any biopsies were taken, your doctors clinic will contact you in 1 to 2   weeks with any results.  - Patient has a contact number available for emergencies.  The signs and   symptoms of potential delayed complications were discussed with the   patient.  Return to normal activities tomorrow.  Written discharge   instructions were provided to the patient.   - Resume previous diet.   - Continue present medications.   - Repeat upper endoscopy in 1 year for surveillance.   - Discharge patient to home (ambulatory).   - Follow an antireflux regimen.   - Return to GI office PRN.  For questions, problems or results please call your physician - Hari Lamb MD at Work:  [phone].  [facility], EMERGENCY ROOM PHONE NUMBER: [phone]  IF A COMPLICATION OR EMERGENCY SITUATION ARISES AND YOU ARE UNABLE TO REACH   YOUR PHYSICIAN - GO DIRECTLY TO THE EMERGENCY ROOM.  Hari Lamb MD  7/7/2025 2:45:50 PM  This report has been verified and signed electronically.  Dear patient,  As a result of recent federal legislation (The Federal Cures Act), you may   receive lab or pathology results from your procedure in your [facility]   account before your physician is able to contact you. Your physician or   their representative will relay the results to you with their   recommendations at their soonest availability.  Thank you,  PROVATION

## 2025-07-07 NOTE — TRANSFER OF CARE
Anesthesia Transfer of Care Note    Patient: Puma Oreilly    Procedure(s) Performed: Procedure(s) (LRB):  EGD (ESOPHAGOGASTRODUODENOSCOPY) (N/A)    Patient location: PACU    Anesthesia Type: general    Transport from OR: Transported from OR on 2-3 L/min O2 by NC with adequate spontaneous ventilation    Post pain: adequate analgesia    Post assessment: no apparent anesthetic complications and tolerated procedure well    Post vital signs: stable    Level of consciousness: sedated and responds to stimulation    Nausea/Vomiting: no nausea/vomiting    Complications: none    Transfer of care protocol was followed    Last vitals: Visit Vitals  Ht 5' (1.524 m)   Wt 63.5 kg (140 lb)   BMI 27.34 kg/m²

## 2025-07-07 NOTE — ANESTHESIA PREPROCEDURE EVALUATION
07/07/2025  Puma Oreilly is a 65 y.o., female.      Pre-op Assessment    I have reviewed the Patient Summary Reports.    I have reviewed the NPO Status.   I have reviewed the Medications.     Review of Systems  Anesthesia Hx:  No problems with previous Anesthesia                Cardiovascular:         Dysrhythmias atrial fibrillation                  Shortness of Breath                      Atrial Fibrillation     Pulmonary:      Shortness of breath                  Hepatic/GI:     GERD   S/P bariatric      Gerd          Musculoskeletal:     S/P ACF       Spine Disorders: cervical and lumbar                Physical Exam  General: Well nourished    Airway:  Mallampati: II   Mouth Opening: Normal  TM Distance: Normal  Neck ROM: Normal ROM        Anesthesia Plan  Type of Anesthesia, risks & benefits discussed:    Anesthesia Type: Gen Natural Airway  Intra-op Monitoring Plan: Standard ASA Monitors  Induction:  IV  Informed Consent: Informed consent signed with the Patient and all parties understand the risks and agree with anesthesia plan.  All questions answered.   ASA Score: 2    Ready For Surgery From Anesthesia Perspective.     .

## 2025-07-07 NOTE — H&P
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 States that symptoms are absent, no alleviating/exacerbating factors. No bleeding or weight loss.     ROS:  No headache, no fever/chills, no chest pain/SOB, no nausea/vomiting/diarrhea/constipation/GI bleeding/abdominal pain, no dysuria/hematuria except where otherwise indicated above.    VSSAF   Exam:   Alert and oriented x 3; no apparent distress   PERRLA, sclera anicteric  CV: Regular rate/rhythm, normal PMI   Lungs: Clear bilaterally with no wheeze/rales   Abdomen: Soft, NT/ND, normal bowel sounds   Ext: No cyanosis, clubbing     Impression:   As above    Plan:   Proceed with endoscopy. Further recs to follow.

## 2025-07-07 NOTE — TELEPHONE ENCOUNTER
Spoke with patient via phone. Confirmed with patient that she needs to remain NPO until procedure today.

## 2025-07-08 NOTE — ANESTHESIA POSTPROCEDURE EVALUATION
Anesthesia Post Evaluation    Patient: Puma Oreilly    Procedure(s) Performed: Procedure(s) (LRB):  EGD (ESOPHAGOGASTRODUODENOSCOPY) (N/A)    Final Anesthesia Type: general      Patient location during evaluation: PACU  Patient participation: Yes- Able to Participate  Level of consciousness: awake  Post-procedure vital signs: reviewed and stable  Pain management: adequate  Airway patency: patent    PONV status at discharge: No PONV  Anesthetic complications: no      Cardiovascular status: blood pressure returned to baseline  Respiratory status: unassisted  Hydration status: euvolemic  Follow-up not needed.              Vitals Value Taken Time   /79 07/07/25 15:09   Temp 36.4 °C (97.5 °F) 07/07/25 15:05   Pulse 57 07/07/25 15:10   Resp 22 07/07/25 15:10   SpO2 99 % 07/07/25 15:10   Vitals shown include unfiled device data.      Event Time   Out of Recovery 15:20:41         Pain/Bonnie Score: Bonnie Score: 10 (7/7/2025  3:08 PM)

## 2025-07-17 DIAGNOSIS — I47.10 SVT (SUPRAVENTRICULAR TACHYCARDIA): ICD-10-CM

## 2025-07-17 RX ORDER — METOPROLOL SUCCINATE 25 MG/1
25 TABLET, EXTENDED RELEASE ORAL
Qty: 90 TABLET | Refills: 3 | Status: SHIPPED | OUTPATIENT
Start: 2025-07-17

## 2025-07-18 ENCOUNTER — TELEPHONE (OUTPATIENT)
Dept: FAMILY MEDICINE | Facility: CLINIC | Age: 65
End: 2025-07-18
Payer: COMMERCIAL

## 2025-07-18 NOTE — TELEPHONE ENCOUNTER
----- Message from Nurse Garcia sent at 7/18/2025  9:33 AM CDT -----    ----- Message -----  From: Naz Herring  Sent: 7/18/2025   9:33 AM CDT  To: [facility] Staff    Patient calling to see if she had a mammogram that was completed ?  [phone]

## 2025-07-24 ENCOUNTER — TELEPHONE (OUTPATIENT)
Dept: FAMILY MEDICINE | Facility: CLINIC | Age: 65
End: 2025-07-24
Payer: COMMERCIAL

## 2025-07-24 DIAGNOSIS — Z12.31 ENCOUNTER FOR SCREENING MAMMOGRAM FOR MALIGNANT NEOPLASM OF BREAST: Primary | ICD-10-CM

## 2025-07-24 NOTE — TELEPHONE ENCOUNTER
----- Message from Nurse Garcia sent at 7/24/2025  3:30 PM CDT -----    ----- Message -----  From: Halima Kumari MA  Sent: 7/24/2025   3:27 PM CDT  To: [facility] Staff    Pt requesting orders for mammogram     Pt #[phone]

## 2025-08-06 ENCOUNTER — HOSPITAL ENCOUNTER (OUTPATIENT)
Dept: RADIOLOGY | Facility: HOSPITAL | Age: 65
Discharge: HOME | End: 2025-08-06
Attending: PHYSICIAN ASSISTANT
Payer: COMMERCIAL

## 2025-08-06 VITALS — BODY MASS INDEX: 27.48 KG/M2 | WEIGHT: 140 LBS | HEIGHT: 60 IN

## 2025-08-06 DIAGNOSIS — Z12.31 ENCOUNTER FOR SCREENING MAMMOGRAM FOR MALIGNANT NEOPLASM OF BREAST: ICD-10-CM

## 2025-08-06 PROCEDURE — 77067 SCR MAMMO BI INCL CAD: CPT | Mod: 26,,, | Performed by: RADIOLOGY

## 2025-08-06 PROCEDURE — 77063 BREAST TOMOSYNTHESIS BI: CPT | Mod: 26,,, | Performed by: RADIOLOGY

## 2025-08-06 PROCEDURE — 77067 SCR MAMMO BI INCL CAD: CPT | Mod: TC,PO

## (undated) DEVICE — BOWL FLUID - BACK STOP

## (undated) DEVICE — R CATH BIDIRECTIONL DF CRV 7FR

## (undated) DEVICE — CATH PENTARY F 2-6-2MM 115CM

## (undated) DEVICE — ELECTRODE REM PLYHSV RETURN 9

## (undated) DEVICE — CATH BIDIRECTIONAL DF CRV 7FR

## (undated) DEVICE — CATH TRICUSPID HALO XP 7FRX110

## (undated) DEVICE — PACK EP DRAPE OMC

## (undated) DEVICE — R CATH RESPONS QPLR JSN 6F 120

## (undated) DEVICE — SHEATH HEMOSTASIS 8.5FR

## (undated) DEVICE — CATH THERMOCOOL SMTCH SF D F

## (undated) DEVICE — PATCH CARTO REFERENCE

## (undated) DEVICE — INTRO 8.5FR 63CM SRO

## (undated) DEVICE — INTRODUCER HEMOSTASIS 7.5F

## (undated) DEVICE — SET SMARTABLATE IRR TUBE

## (undated) DEVICE — PAD DEFIB CADENCE ADULT R2